# Patient Record
Sex: MALE | Race: WHITE | NOT HISPANIC OR LATINO | Employment: FULL TIME | URBAN - METROPOLITAN AREA
[De-identification: names, ages, dates, MRNs, and addresses within clinical notes are randomized per-mention and may not be internally consistent; named-entity substitution may affect disease eponyms.]

---

## 2017-12-06 ENCOUNTER — TELEPHONE (OUTPATIENT)
Dept: CARDIOLOGY | Facility: CLINIC | Age: 62
End: 2017-12-06

## 2017-12-06 NOTE — TELEPHONE ENCOUNTER
Patient called in to schedule a visit with a provider. Hasn't been seen here since 2014.  Alex in scheduling transferred the patient to me as he was requesting to talk to a nurse. He said he woke up last night to go to the bathroom and experienced 8/10 chest pain when he was up. Laid back down and it resolved because he fell back to sleep. He says for the past few months he has experienced chest pain a few times. He admits he never followed up with Dr. Gibson nor has he been taking crestor. He does take a baby aspirin every day. He says he just tried to follow a healthier lifestyle. He is scheduled to see Dr. Villatoro 1/10/18. I advised him to establish primary care somewhere before January 10 so he can get a physical and lab work. He said he will do that. I also advised him if he has severe chest pain that does not resolve or if he has chest pain with sweating or nausea to go to an ER right away. He verbalized understanding.

## 2017-12-08 ENCOUNTER — PRE VISIT (OUTPATIENT)
Dept: CARDIOLOGY | Facility: CLINIC | Age: 62
End: 2017-12-08

## 2017-12-08 ENCOUNTER — OFFICE VISIT (OUTPATIENT)
Dept: FAMILY MEDICINE | Facility: CLINIC | Age: 62
End: 2017-12-08
Payer: COMMERCIAL

## 2017-12-08 VITALS
DIASTOLIC BLOOD PRESSURE: 78 MMHG | RESPIRATION RATE: 14 BRPM | HEIGHT: 69 IN | SYSTOLIC BLOOD PRESSURE: 120 MMHG | HEART RATE: 56 BPM | WEIGHT: 174.5 LBS | TEMPERATURE: 97.8 F | OXYGEN SATURATION: 98 % | BODY MASS INDEX: 25.84 KG/M2

## 2017-12-08 DIAGNOSIS — Z00.00 ENCOUNTER FOR ROUTINE ADULT HEALTH EXAMINATION WITHOUT ABNORMAL FINDINGS: Primary | ICD-10-CM

## 2017-12-08 DIAGNOSIS — Z23 NEED FOR PROPHYLACTIC VACCINATION AND INOCULATION AGAINST INFLUENZA: ICD-10-CM

## 2017-12-08 DIAGNOSIS — Z13.1 SCREENING FOR DIABETES MELLITUS: ICD-10-CM

## 2017-12-08 DIAGNOSIS — Z13.220 LIPID SCREENING: ICD-10-CM

## 2017-12-08 DIAGNOSIS — E78.00 HYPERCHOLESTEROLEMIA: ICD-10-CM

## 2017-12-08 DIAGNOSIS — I25.10 CORONARY ARTERY DISEASE INVOLVING NATIVE CORONARY ARTERY OF NATIVE HEART WITHOUT ANGINA PECTORIS: ICD-10-CM

## 2017-12-08 DIAGNOSIS — Z12.5 SPECIAL SCREENING FOR MALIGNANT NEOPLASM OF PROSTATE: ICD-10-CM

## 2017-12-08 LAB
ALBUMIN SERPL-MCNC: 3.9 G/DL (ref 3.4–5)
ALP SERPL-CCNC: 65 U/L (ref 40–150)
ALT SERPL W P-5'-P-CCNC: 42 U/L (ref 0–70)
ANION GAP SERPL CALCULATED.3IONS-SCNC: 7 MMOL/L (ref 3–14)
AST SERPL W P-5'-P-CCNC: 26 U/L (ref 0–45)
BILIRUB SERPL-MCNC: 0.8 MG/DL (ref 0.2–1.3)
BUN SERPL-MCNC: 17 MG/DL (ref 7–30)
CALCIUM SERPL-MCNC: 9.2 MG/DL (ref 8.5–10.1)
CHLORIDE SERPL-SCNC: 104 MMOL/L (ref 94–109)
CHOLEST SERPL-MCNC: 236 MG/DL
CO2 SERPL-SCNC: 30 MMOL/L (ref 20–32)
CREAT SERPL-MCNC: 0.89 MG/DL (ref 0.66–1.25)
GFR SERPL CREATININE-BSD FRML MDRD: 86 ML/MIN/1.7M2
GLUCOSE SERPL-MCNC: 86 MG/DL (ref 70–99)
HBA1C MFR BLD: 5 % (ref 4.3–6)
HDLC SERPL-MCNC: 40 MG/DL
LDLC SERPL CALC-MCNC: 139 MG/DL
NONHDLC SERPL-MCNC: 196 MG/DL
POTASSIUM SERPL-SCNC: 4.2 MMOL/L (ref 3.4–5.3)
PROT SERPL-MCNC: 7.6 G/DL (ref 6.8–8.8)
PSA SERPL-ACNC: 3.76 UG/L (ref 0–4)
SODIUM SERPL-SCNC: 141 MMOL/L (ref 133–144)
TRIGL SERPL-MCNC: 284 MG/DL

## 2017-12-08 PROCEDURE — 99396 PREV VISIT EST AGE 40-64: CPT | Mod: 25 | Performed by: FAMILY MEDICINE

## 2017-12-08 PROCEDURE — 80061 LIPID PANEL: CPT | Performed by: FAMILY MEDICINE

## 2017-12-08 PROCEDURE — 80053 COMPREHEN METABOLIC PANEL: CPT | Performed by: FAMILY MEDICINE

## 2017-12-08 PROCEDURE — 83036 HEMOGLOBIN GLYCOSYLATED A1C: CPT | Performed by: FAMILY MEDICINE

## 2017-12-08 PROCEDURE — 36415 COLL VENOUS BLD VENIPUNCTURE: CPT | Performed by: FAMILY MEDICINE

## 2017-12-08 PROCEDURE — G0103 PSA SCREENING: HCPCS | Performed by: FAMILY MEDICINE

## 2017-12-08 PROCEDURE — 90686 IIV4 VACC NO PRSV 0.5 ML IM: CPT | Performed by: FAMILY MEDICINE

## 2017-12-08 PROCEDURE — 90471 IMMUNIZATION ADMIN: CPT | Performed by: FAMILY MEDICINE

## 2017-12-08 NOTE — PROGRESS NOTES
SUBJECTIVE:   CC: Yannick Urena is an 62 year old male who presents for preventative health visit.     Physical   Annual:     Getting at least 3 servings of Calcium per day::  Yes    Bi-annual eye exam::  NO    Dental care twice a year::  NO    Sleep apnea or symptoms of sleep apnea::  None    Diet::  Regular (no restrictions)    Taking medications regularly::  Yes    Medication side effects::  Muscle aches    Additional concerns today::  YES      Today's PHQ-2 Score:   PHQ-2 ( 1999 [employer]) 12/8/2017   Q1: Little interest or pleasure in doing things 0   Q2: Feeling down, depressed or hopeless 0   PHQ-2 Score 0   Q1: Little interest or pleasure in doing things Not at all   Q2: Feeling down, depressed or hopeless Not at all   PHQ-2 Score 0       Abuse: Current or Past(Physical, Sexual or Emotional)- No  Do you feel safe in your environment - Yes    Social History   Substance Use Topics     Smoking status: Never Smoker     Smokeless tobacco: Never Used     Alcohol use Yes      Comment: once a month     The patient does not drink >3 drinks per day nor >7 drinks per week.    Last PSA:   PSA   Date Value Ref Range Status   12/14/2015 3.63 0 - 4 ug/L Final       Reviewed orders with patient. Reviewed health maintenance and updated orders accordingly - Yes  Labs reviewed in EPIC    Reviewed and updated as needed this visit by clinical staff  Tobacco  Allergies  Meds  Med Hx  Surg Hx  Fam Hx  Soc Hx        Reviewed and updated as needed this visit by Provider        Past Medical History:   Diagnosis Date     Benign essential hypertension      Coronary artery disease     CT calcium score 2012 @ [facility] = 286     Hyperlipidemia LDL goal <130       Past Surgical History:   Procedure Laterality Date     EXTRACTION(S) DENTAL  2013      REPAIR OF NASAL SEPTUM  1978     TONSILLECTOMY  1960's     wisdom teeth removal  age 14       Review of Systems  C: NEGATIVE for fever, chills, change in weight  I: NEGATIVE for  "worrisome rashes, moles or lesions  E: NEGATIVE for vision changes or irritation  ENT: NEGATIVE for ear, mouth and throat problems  R: NEGATIVE for significant cough or SOB  CV: NEGATIVE for chest pain, palpitations or peripheral edema  GI: NEGATIVE for nausea, abdominal pain, heartburn, or change in bowel habits   male: negative for dysuria, hematuria, decreased urinary stream, erectile dysfunction, urethral discharge  M: NEGATIVE for significant arthralgias or myalgia  N: NEGATIVE for weakness, dizziness or paresthesias  P: NEGATIVE for changes in mood or affect    OBJECTIVE:   /78 (BP Location: Right arm, Patient Position: Sitting, Cuff Size: Adult Regular)  Pulse 56  Temp 97.8  F (36.6  C) (Tympanic)  Resp 14  Ht 5' 9.25\" (1.759 m)  Wt 174 lb 8 oz (79.2 kg)  SpO2 98%  BMI 25.58 kg/m2    Physical Exam  GENERAL: healthy, alert and no distress  EYES: Eyes grossly normal to inspection, PERRL and conjunctivae and sclerae normal  HENT: ear canals and TM's normal, nose and mouth without ulcers or lesions  NECK: no adenopathy, no asymmetry, masses, or scars and thyroid normal to palpation  RESP: lungs clear to auscultation - no rales, rhonchi or wheezes  CV: regular rate and rhythm, normal S1 S2, no S3 or S4, no murmur, click or rub, no peripheral edema and peripheral pulses strong  ABDOMEN: soft, nontender, no hepatosplenomegaly, no masses and bowel sounds normal  MS: no gross musculoskeletal defects noted, no edema  SKIN: no suspicious lesions or rashes  NEURO: Normal strength and tone, mentation intact and speech normal  PSYCH: mentation appears normal, affect normal/bright    ASSESSMENT/PLAN:   Yannick was seen today for physical.    Diagnoses and all orders for this visit:    Encounter for routine adult health examination without abnormal findings    Lipid screening  -     Lipid Profile (Chol, Trig, HDL, LDL calc)  -     Comprehensive metabolic panel (BMP + Alb, Alk Phos, ALT, AST, Total. Bili, " "TP)    Screening for diabetes mellitus  -     Hemoglobin A1c  -     Comprehensive metabolic panel (BMP + Alb, Alk Phos, ALT, AST, Total. Bili, TP)    Special screening for malignant neoplasm of prostate  -     PSA, screen    Need for prophylactic vaccination and inoculation against influenza  -     FLU VAC, SPLIT VIRUS IM > 3 YO (QUADRIVALENT) [95615]  -     Vaccine Administration, Initial [36919]      Pt requesting PSA screen again.  Deferred rectal/prostrate check per pt request.  No chest pain complaints today.  Seeing Cardiology in Jan 2018.  Last Colonoscopy in 2011, due in 10 years (year 2021).   COUNSELING:   Reviewed preventive health counseling, as reflected in patient instructions  Special attention given to:        Regular exercise       Healthy diet/nutrition       Vision screening       Hearing screening       Colon cancer screening       Prostate cancer screening           reports that he has never smoked. He has never used smokeless tobacco.      Estimated body mass index is 25.58 kg/(m^2) as calculated from the following:    Height as of this encounter: 5' 9.25\" (1.759 m).    Weight as of this encounter: 174 lb 8 oz (79.2 kg).   Weight management plan: Discussed healthy diet and exercise guidelines and patient will follow up in 1 month in clinic to re-evaluate.    Counseling Resources:  ATP IV Guidelines  Pooled Cohorts Equation Calculator  FRAX Risk Assessment  ICSI Preventive Guidelines  Dietary Guidelines for Americans, 2010  USDA's MyPlate  ASA Prophylaxis  Lung CA Screening    Gracie Wolff, [facility]  Answers for HPI/ROS submitted by the patient on 12/8/2017   PHQ-2 Score: 0    "

## 2017-12-08 NOTE — PROGRESS NOTES

## 2017-12-08 NOTE — NURSING NOTE
"Chief Complaint   Patient presents with     Physical     62 year old male presents for physical exam.       Initial /78 (BP Location: Right arm, Patient Position: Sitting, Cuff Size: Adult Regular)  Pulse 56  Temp 97.8  F (36.6  C) (Tympanic)  Resp 14  Ht 5' 9.25\" (1.759 m)  Wt 174 lb 8 oz (79.2 kg)  SpO2 98%  BMI 25.58 kg/m2 Estimated body mass index is 25.58 kg/(m^2) as calculated from the following:    Height as of this encounter: 5' 9.25\" (1.759 m).    Weight as of this encounter: 174 lb 8 oz (79.2 kg).  Medication Reconciliation: complete     Katelynn Dickinosn LPN  "

## 2017-12-08 NOTE — LETTER
"December 11, 2017      Yannick Urena  [address] [facility] [address]        Dear ,    We are writing to inform you of your test results.    1.  Cholesterol:  We use a calculation recommended from the American College of Cardiology (the \"ASCVD\" risk calculation, \"ArtheroSclerotic Cardiovascular Disease\") before starting anyone on a cholesterol medication.  This measures the risk of getting a heart-attack/stroke in the next 10 years, and your risk is 12.8% and a high-dose cholesterol medication is what's recommended (or the highest dose that you can tolerate).   The main risk factors used in the calculation are older age, elevated blood pressure (\"hypertension\"), diabetes, cholesterol level, and a history of smoking tobacco.  We typically start cholesterol medication when the risk is >7.5%.   I would recommend that we re-start your Crestor 20 mg daily and increase as tolerated to a goal of 40 mg daily. I've sent a refill to your pharmacy.    2.  Your other lab tests are normal.  --Your HgA1c (diabetes screen) is normal at 5%  --Your PSA level (prostrate cancer screen) is stable and within the normal range at 3.76.  --And your CMP (liver and kidney functions) are normal.    Resulted Orders   Lipid Profile (Chol, Trig, HDL, LDL calc)   Result Value Ref Range    Cholesterol 236 (H) <200 mg/dL      Comment:      Desirable:       <200 mg/dl    Triglycerides 284 (H) <150 mg/dL      Comment:      Borderline high:  150-199 mg/dl  High:             200-499 mg/dl  Very high:       >499 mg/dl  Fasting specimen      HDL Cholesterol 40 >39 mg/dL    LDL Cholesterol Calculated 139 (H) <100 mg/dL      Comment:      Above desirable:  100-129 mg/dl  Borderline High:  130-159 mg/dL  High:             160-189 mg/dL  Very high:       >189 mg/dl      Non HDL Cholesterol 196 (H) <130 mg/dL      Comment:      Above Desirable:  130-159 mg/dl  Borderline high:  160-189 mg/dl  High:             190-219 mg/dl  Very " high:       >219 mg/dl     Hemoglobin A1c   Result Value Ref Range    Hemoglobin A1C 5.0 4.3 - 6.0 %   Comprehensive metabolic panel (BMP + Alb, Alk Phos, ALT, AST, Total. Bili, TP)   Result Value Ref Range    Sodium 141 133 - 144 mmol/L    Potassium 4.2 3.4 - 5.3 mmol/L    Chloride 104 94 - 109 mmol/L    Carbon Dioxide 30 20 - 32 mmol/L    Anion Gap 7 3 - 14 mmol/L    Glucose 86 70 - 99 mg/dL      Comment:      Fasting specimen    Urea Nitrogen 17 7 - 30 mg/dL    Creatinine 0.89 0.66 - 1.25 mg/dL    GFR Estimate 86 >60 mL/min/1.7m2      Comment:      Non  GFR Calc    GFR Estimate If Black >90 >60 mL/min/1.7m2      Comment:       GFR Calc    Calcium 9.2 8.5 - 10.1 mg/dL    Bilirubin Total 0.8 0.2 - 1.3 mg/dL    Albumin 3.9 3.4 - 5.0 g/dL    Protein Total 7.6 6.8 - 8.8 g/dL    Alkaline Phosphatase 65 40 - 150 U/L    ALT 42 0 - 70 U/L    AST 26 0 - 45 U/L   PSA, screen   Result Value Ref Range    PSA 3.76 0 - 4 ug/L      Comment:      Assay Method:  Chemiluminescence using Siemens Vista analyzer       If you have any questions or concerns, please call the clinic at the number listed above.       Sincerely,        Gracie Wolff, DO

## 2017-12-08 NOTE — MR AVS SNAPSHOT
After Visit Summary   12/8/2017    Yannick Urena    MRN: [id number]           Patient Information     Date Of Birth          1955        Visit Information        Provider Department      12/8/2017 9:50 AM Gracie Wolff, DO [facility]        Today's Diagnoses     Lipid screening    -  1    Screening for diabetes mellitus        Special screening for malignant neoplasm of prostate          Care Instructions      Preventive Health Recommendations  Male Ages 50 - 64    Yearly exam:             See your health care provider every year in order to  o   Review health changes.   o   Discuss preventive care.    o   Review your medicines if your doctor has prescribed any.     Have a cholesterol test every 5 years, or more frequently if you are at risk for high cholesterol/heart disease.     Have a diabetes test (fasting glucose) every three years. If you are at risk for diabetes, you should have this test more often.     Have a colonoscopy at age 50, or have a yearly FIT test (stool test). These exams will check for colon cancer.      Talk with your health care provider about whether or not a prostate cancer screening test (PSA) is right for you.    You should be tested each year for STDs (sexually transmitted diseases), if you re at risk.     Shots: Get a flu shot each year. Get a tetanus shot every 10 years.     Nutrition:    Eat at least 5 servings of fruits and vegetables daily.     Eat whole-grain bread, whole-wheat pasta and brown rice instead of white grains and rice.     Talk to your provider about Calcium and Vitamin D.     Lifestyle    Exercise for at least 150 minutes a week (30 minutes a day, 5 days a week). This will help you control your weight and prevent disease.     Limit alcohol to one drink per day.     No smoking.     Wear sunscreen to prevent skin cancer.     See your dentist every six months for an exam and cleaning.     See your eye doctor every  "1 to 2 years.            Follow-ups after your visit        Follow-up notes from your care team     Return if symptoms worsen or fail to improve.      Your next 10 appointments already scheduled     Ayo 10, 2018  8:15 AM CST   New Visit with Oscar Villatoro MD   [facility] ([facility])    [address] [place] [address] [phone] [phone]              Who to contact     If you have questions or need follow up information about today's clinic visit or your schedule please contact [facility] directly at [phone].  Normal or non-critical lab and imaging results will be communicated to you by MyChart, letter or phone within 4 business days after the clinic has received the results. If you do not hear from us within 7 days, please contact the clinic through MyChart or phone. If you have a critical or abnormal lab result, we will notify you by phone as soon as possible.  Submit refill requests through [employer] or call your pharmacy and they will forward the refill request to us. Please allow 3 business days for your refill to be completed.          Additional Information About Your Visit        MyChart Information     [employer] lets you send messages to your doctor, view your test results, renew your prescriptions, schedule appointments and more. To sign up, go to [email or web address] . Click on \"Log in\" on the left side of the screen, which will take you to the Welcome page. Then click on \"Sign up Now\" on the right side of the page.     You will be asked to enter the access code listed below, as well as some personal information. Please follow the directions to create your username and password.     Your access code is: [email or web address]  Expires: 3/8/2018 10:24 AM     Your access code will  in 90 days. If you need help or a new code, please call your [facility] or [phone].        Care EveryWhere ID     This " "is your Care EveryWhere ID. This could be used by other organizations to access your [facility] medical records  [id number]        Your Vitals Were     Pulse Temperature Respirations Height Pulse Oximetry BMI (Body Mass Index)    56 97.8  F (36.6  C) (Tympanic) 14 5' 9.25\" (1.759 m) 98% 25.58 kg/m2       Blood Pressure from Last 3 Encounters:   12/08/17 120/78   12/14/15 130/76   06/01/15 133/79    Weight from Last 3 Encounters:   12/08/17 174 lb 8 oz (79.2 kg)   12/14/15 170 lb (77.1 kg)   06/01/15 166 lb (75.3 kg)              We Performed the Following     Comprehensive metabolic panel (BMP + Alb, Alk Phos, ALT, AST, Total. Bili, TP)     Hemoglobin A1c     Lipid Profile (Chol, Trig, HDL, LDL calc)     PSA, screen        Primary Care Provider Office Phone # Fax #    Radha Neeta Clement,  [phone]       [place] [address]        Equal Access to Services     [facility] : Hadii aad ku hadasho Soomaali, waaxda luqadaha, qaybta kaalmada adesaroj, bess conner. So [facility] [phone].    ATENCIÓN: Si habla español, tiene a castro disposición servicios gratuitos de asistencia lingüística. Nadine al [phone].    We comply with applicable federal civil rights laws and [place] laws. We do not discriminate on the basis of race, color, national origin, age, disability, sex, sexual orientation, or gender identity.            Thank you!     Thank you for choosing [facility]  for your care. Our goal is always to provide you with excellent care. Hearing back from our patients is one way we can continue to improve our services. Please take a few minutes to complete the written survey that you may receive in the mail after your visit with us. Thank you!             Your Updated Medication List - Protect others around you: Learn how to safely use, store and throw away your medicines at www.disposemymeds.org.          This list " is accurate as of: 12/8/17 10:24 AM.  Always use your most recent med list.                   Brand Name Dispense Instructions for use Diagnosis    aspirin 325 MG tablet      Take 1 tablet by mouth daily.        MULTI-VITAMIN DAILY PO      Take  by mouth.        rosuvastatin 20 MG tablet    CRESTOR    90 tablet    Take 1 tablet (20 mg) by mouth daily    Hypercholesterolemia

## 2017-12-11 RX ORDER — ROSUVASTATIN CALCIUM 20 MG/1
20 TABLET, COATED ORAL DAILY
Qty: 90 TABLET | Refills: 1 | Status: SHIPPED | OUTPATIENT
Start: 2017-12-11 | End: 2018-11-28

## 2017-12-14 NOTE — TELEPHONE ENCOUNTER
Reply from [place] clinic, patient was not seen there, his PMD changed clinics. Spoke with patient and he has re-established care with Dr. Wolff. He states he has had no care for about 3 years, but his brother needed 4 stents recently and this prompted him to call. Patient also notes that he occasionally wakes at night with chest discomfort although not pain. He had another episode the week of December 4th so decided to have a physical and see cardiology again.

## 2018-01-10 ENCOUNTER — OFFICE VISIT (OUTPATIENT)
Dept: CARDIOLOGY | Facility: CLINIC | Age: 63
End: 2018-01-10
Payer: COMMERCIAL

## 2018-01-10 VITALS
BODY MASS INDEX: 26.07 KG/M2 | WEIGHT: 176 LBS | HEART RATE: 60 BPM | DIASTOLIC BLOOD PRESSURE: 70 MMHG | HEIGHT: 69 IN | SYSTOLIC BLOOD PRESSURE: 136 MMHG

## 2018-01-10 DIAGNOSIS — I25.10 CORONARY ARTERY DISEASE INVOLVING NATIVE CORONARY ARTERY OF NATIVE HEART WITHOUT ANGINA PECTORIS: Primary | ICD-10-CM

## 2018-01-10 PROCEDURE — 93000 ELECTROCARDIOGRAM COMPLETE: CPT | Performed by: INTERNAL MEDICINE

## 2018-01-10 PROCEDURE — 99204 OFFICE O/P NEW MOD 45 MIN: CPT | Performed by: INTERNAL MEDICINE

## 2018-01-10 NOTE — PROGRESS NOTES
HISTORY OF PRESENT ILLNESS:  I had the pleasure of seeing Mr. Yannick Urena in consultation at [facility] today.  He is a very pleasant 62-year-old gentleman who has a history of coronary artery disease based on a coronary artery calcium score performed in 2012.  At that time he was found to have a calcium score to 286.3 with a left main calcium score of 37.9, LAD of 34.9, circumflex 64.5 and RCA of 159.  He also has a significant family history of premature coronary disease and significant dyslipidemia and saw Dr. Gibson in 03/2013 for evaluation for this.        He ultimately underwent a nuclear stress test in 03/2013 which was negative for ischemia.  Subsequently, he was started on Lipitor and aspirin.  He did not tolerate Lipitor due to muscle aches and pains and was prescribed Crestor.        He has not really had any cardiology followup since 2014.  He actually discontinued his statin as well over the past few years.  Recently he reestablished primary care and did mention some occasional chest discomfort, and therefore Cardiology consultation was requested.  He also had a lipid panel performed earlier in December and his Crestor was restarted at that time.      He presents today feeling well overall from a cardiovascular standpoint.  In early December, he experienced an episode where he woke up at night with substernal chest discomfort.  This resolved in about 30 minutes.  He does describe 3-4 similar episodes that have always occurred at rest.  He is a very active individual and mows lawns during the summer.  He recently was hunting and walked up to 14 miles without any chest discomfort or shortness of breath.  He denies any palpitations, syncope or presyncope.  Denies any PND or lower extremity edema.      His past medical history, social history, allergies and meds are reviewed in my Epic note.  Physical exam is dictated below.      Lipids on 12/08/2017 demonstrated total  cholesterol of 236, HDL 40, , triglycerides of 284.  When I asked him about the duration of his Crestor therapy, he stated that he did not  his prescription for at least a week and therefore has only been on lipid-lowering therapy for the past 2-3 weeks.      An EKG today demonstrated normal sinus rhythm with no acute ST-T wave abnormalities.      IMPRESSION:   1.  History of coronary disease based on an abnormal calcium score in .   2.  Significant dyslipidemia which has been essentially untreated over the past few years.   3.  Significant family history of coronary artery disease.   4.  Atypical chest discomfort in 2017.  His symptoms have not recurred and have never been noted with exertion.      Mr. Urena presents for an evaluation regarding an episode of chest discomfort that is quite atypical in that it started at rest and has not recurred with heavy exertion over the past month or so.  Nevertheless, given his significant risk factor profile, I think that further evaluation is warranted and I have ordered an exercise stress nuclear perfusion study for further evaluation.  I explained to the patient that if this identifies areas of ischemia and then a coronary angiography may be indicated.      From a risk factor reduction standpoint, I have stressed the importance of compliance with lipid-lowering therapy.  A followup lipid panel should be performed in approximately 3-4 weeks.  He will continue aspirin as previously prescribed.      It was a pleasure seeing him in consultation.         CHRISTINA FRANCIS MD             D: 01/10/2018 08:44   T: 01/10/2018 10:02   MT: ALTHEA      Name:     ANA URENA   MRN:      [id number]        Account:      [id number]   :      1955           Service Date: 01/10/2018      Document: [id number]

## 2018-01-10 NOTE — LETTER
1/10/2018      Gracie Wolff, DO  [address]  [facility] [address]      RE: Yannick Antonioon       Dear Colleague,    I had the pleasure of seeing Yannick Urena in the [facility].    HISTORY OF PRESENT ILLNESS:  I had the pleasure of seeing Mr. Yannick Urena in consultation at [facility] today.  He is a very pleasant 62-year-old gentleman who has a history of coronary artery disease based on a coronary artery calcium score performed in 2012.  At that time he was found to have a calcium score to 286.3 with a left main calcium score of 37.9, LAD of 34.9, circumflex 64.5 and RCA of 159.  He also has a significant family history of premature coronary disease and significant dyslipidemia and saw Dr. Gibson in 03/2013 for evaluation for this.        He ultimately underwent a nuclear stress test in 03/2013 which was negative for ischemia.  Subsequently, he was started on Lipitor and aspirin.  He did not tolerate Lipitor due to muscle aches and pains and was prescribed Crestor.        He has not really had any cardiology followup since 2014.  He actually discontinued his statin as well over the past few years.  Recently he reestablished primary care and did mention some occasional chest discomfort, and therefore Cardiology consultation was requested.  He also had a lipid panel performed earlier in December and his Crestor was restarted at that time.      He presents today feeling well overall from a cardiovascular standpoint.  In early December, he experienced an episode where he woke up at night with substernal chest discomfort.  This resolved in about 30 minutes.  He does describe 3-4 similar episodes that have always occurred at rest.  He is a very active individual and mows lawns during the summer.  He recently was hunting and walked up to 14 miles without any chest discomfort or shortness of breath.  He denies any palpitations, syncope or  presyncope.  Denies any PND or lower extremity edema.      His past medical history, social history, allergies and meds are reviewed in my Epic note.  Physical exam is dictated below.      Lipids on 12/08/2017 demonstrated total cholesterol of 236, HDL 40, , triglycerides of 284.  When I asked him about the duration of his Crestor therapy, he stated that he did not  his prescription for at least a week and therefore has only been on lipid-lowering therapy for the past 2-3 weeks.      An EKG today demonstrated normal sinus rhythm with no acute ST-T wave abnormalities.      IMPRESSION:   1.  History of coronary disease based on an abnormal calcium score in 2012.   2.  Significant dyslipidemia which has been essentially untreated over the past few years.   3.  Significant family history of coronary artery disease.   4.  Atypical chest discomfort in 12/2017.  His symptoms have not recurred and have never been noted with exertion.      Mr. Urena presents for an evaluation regarding an episode of chest discomfort that is quite atypical in that it started at rest and has not recurred with heavy exertion over the past month or so.  Nevertheless, given his significant risk factor profile, I think that further evaluation is warranted and I have ordered an exercise stress nuclear perfusion study for further evaluation.  I explained to the patient that if this identifies areas of ischemia and then a coronary angiography may be indicated.      From a risk factor reduction standpoint, I have stressed the importance of compliance with lipid-lowering therapy.  A followup lipid panel should be performed in approximately 3-4 weeks.  He will continue aspirin as previously prescribed.      It was a pleasure seeing him in consultation.     Outpatient Encounter Prescriptions as of 1/10/2018   Medication Sig Dispense Refill     rosuvastatin (CRESTOR) 20 MG tablet Take 1 tablet (20 mg) by mouth daily 90 tablet 1     aspirin  325 MG tablet Take 1 tablet by mouth daily.       Multiple Vitamin (MULTI-VITAMIN DAILY PO) Take  by mouth.       No facility-administered encounter medications on file as of 1/10/2018.      Again, thank you for allowing me to participate in the care of your patient.      Sincerely,    Oscar Villatoro MD     [facility]

## 2018-01-10 NOTE — PROGRESS NOTES
HPI and Plan:   See dictation    Orders Placed This Encounter   Procedures     NM Exercise stress test (nuc card)     Lipid Profile     EKG 12-lead complete w/read - Clinics (performed today)       No orders of the defined types were placed in this encounter.      There are no discontinued medications.      Encounter Diagnosis   Name Primary?     Coronary artery disease involving native coronary artery of native heart without angina pectoris Yes       CURRENT MEDICATIONS:  Current Outpatient Prescriptions   Medication Sig Dispense Refill     rosuvastatin (CRESTOR) 20 MG tablet Take 1 tablet (20 mg) by mouth daily 90 tablet 1     aspirin 325 MG tablet Take 1 tablet by mouth daily.       Multiple Vitamin (MULTI-VITAMIN DAILY PO) Take  by mouth.         ALLERGIES     Allergies   Allergen Reactions     Atorvastatin Muscle Pain (Myalgia)     Penicillins      Unknown reaction     Seasonal Allergies        PAST MEDICAL HISTORY:  Past Medical History:   Diagnosis Date     Benign essential hypertension      Coronary artery disease     CT calcium score 2012 @ [facility] = 286     Hyperlipidemia LDL goal <130        PAST SURGICAL HISTORY:  Past Surgical History:   Procedure Laterality Date     EXTRACTION(S) DENTAL  2013      REPAIR OF NASAL SEPTUM  1978     TONSILLECTOMY  1960's     wisdom teeth removal  age 14       FAMILY HISTORY:  Family History   Problem Relation Age of Onset     HEART DISEASE Father      DIABETES No family hx of        SOCIAL HISTORY:  Social History     Social History     Marital status:      Spouse name: N/A     Number of children: N/A     Years of education: N/A     Occupational History      [employer]     service, [employer]     Social History Main Topics     Smoking status: Never Smoker     Smokeless tobacco: Never Used     Alcohol use Yes      Comment: once a month     Drug use: No     Sexual activity: Yes     Partners: Female      Comment: wife menopause     Other Topics Concern      "Parent/Sibling W/ Cabg, Mi Or Angioplasty Before 65f 55m? Yes     Bike Helmet Yes     Seat Belt Yes     Social History Narrative       Review of Systems:  Skin:  Negative       Eyes:  Negative      ENT:  Negative      Respiratory:  Negative       Cardiovascular:    Positive for;chest pain occ - family hx strong for cad  Gastroenterology: Negative      Genitourinary:  Negative      Musculoskeletal:  Negative      Neurologic:  Negative      Psychiatric:  Negative      Heme/Lymph/Imm:  Negative      Endocrine:  Negative        Physical Exam:  Vitals: /70  Pulse 60  Ht 1.759 m (5' 9.25\")  Wt 79.8 kg (176 lb)  BMI 25.8 kg/m2    Constitutional:  cooperative        Skin:  warm and dry to the touch          Head:  normocephalic        Eyes:  pupils equal and round        Lymph:No Cervical lymphadenopathy present     ENT:           Neck:  carotid pulses are full and equal bilaterally        Respiratory:  clear to auscultation         Cardiac:                  pulses full and equal                                        GI:  abdomen soft        Extremities and Muscular Skeletal:  no edema              Neurological:  no gross motor deficits;affect appropriate        Psych:           CC  No referring provider defined for this encounter.              "

## 2018-01-10 NOTE — MR AVS SNAPSHOT
"              After Visit Summary   1/10/2018    Yannick Urena    MRN: [id number]           Patient Information     Date Of Birth          1955        Visit Information        Provider Department      1/10/2018 8:15 AM Oscar Villatoro MD [facility]        Today's Diagnoses     Coronary artery disease involving native coronary artery of native heart without angina pectoris    -  1       Follow-ups after your visit        Future tests that were ordered for you today     Open Future Orders        Priority Expected Expires Ordered    NM Exercise stress test (nuc card) Routine 1/17/2018 1/10/2019 1/10/2018    Lipid Profile Routine 2/9/2018 1/10/2019 1/10/2018            Who to contact     If you have questions or need follow up information about today's clinic visit or your schedule please contact [facility] directly at [phone].  Normal or non-critical lab and imaging results will be communicated to you by MyChart, letter or phone within 4 business days after the clinic has received the results. If you do not hear from us within 7 days, please contact the clinic through [email or web address] or phone. If you have a critical or abnormal lab result, we will notify you by phone as soon as possible.  Submit refill requests through [employer] or call your pharmacy and they will forward the refill request to us. Please allow 3 business days for your refill to be completed.          Additional Information About Your Visit        MyChart Information     [employer] lets you send messages to your doctor, view your test results, renew your prescriptions, schedule appointments and more. To sign up, go to [email or web address] . Click on \"Log in\" on the left side of the screen, which will take you to the Welcome page. Then click on \"Sign up Now\" on the right side of the page.     You will be asked to enter the access code listed below, as well as some " "personal information. Please follow the directions to create your username and password.     Your access code is: [email or web address]  Expires: 3/8/2018 10:24 AM     Your access code will  in 90 days. If you need help or a new code, please call your [facility] clinic or [phone].        Care EveryWhere ID     This is your Care EveryWhere ID. This could be used by other organizations to access your [facility] medical records  [id number]        Your Vitals Were     Pulse Height BMI (Body Mass Index)             60 1.759 m (5' 9.25\") 25.8 kg/m2          Blood Pressure from Last 3 Encounters:   01/10/18 136/70   17 120/78   12/14/15 130/76    Weight from Last 3 Encounters:   01/10/18 79.8 kg (176 lb)   17 79.2 kg (174 lb 8 oz)   12/14/15 77.1 kg (170 lb)              We Performed the Following     EKG 12-lead complete w/read - Clinics (performed today)        Primary Care Provider Office Phone # Fax #    Gracie Wendy Wolff [phone] [address] [address]        Equal Access to Services     PAMELLA CASTELLANOS AH: Hadii aad ku hadasho Soomaali, waaxda luqadaha, qaybta kaalmada adeegyada, waxay idiin hayjaymie conner. So [facility] [phone].    ATENCIÓN: Si habla español, tiene a castro disposición servicios gratuitos de asistencia lingüística. Llame al [phone].    We comply with applicable federal civil rights laws and [place] laws. We do not discriminate on the basis of race, color, national origin, age, disability, sex, sexual orientation, or gender identity.            Thank you!     Thank you for choosing [facility]  for your care. Our goal is always to provide you with excellent care. Hearing back from our patients is one way we can continue to improve our services. Please take a few minutes to complete the written survey that you may receive in the mail after your visit with us. Thank you!             Your " Updated Medication List - Protect others around you: Learn how to safely use, store and throw away your medicines at www.disposemymeds.org.          This list is accurate as of: 1/10/18  8:38 AM.  Always use your most recent med list.                   Brand Name Dispense Instructions for use Diagnosis    aspirin 325 MG tablet      Take 1 tablet by mouth daily.        MULTI-VITAMIN DAILY PO      Take  by mouth.        rosuvastatin 20 MG tablet    CRESTOR    90 tablet    Take 1 tablet (20 mg) by mouth daily    Hypercholesterolemia

## 2018-01-11 ENCOUNTER — HOSPITAL ENCOUNTER (OUTPATIENT)
Dept: CARDIOLOGY | Facility: CLINIC | Age: 63
Discharge: HOME | End: 2018-01-11
Attending: INTERNAL MEDICINE | Admitting: INTERNAL MEDICINE
Payer: COMMERCIAL

## 2018-01-11 DIAGNOSIS — I25.10 CORONARY ARTERY DISEASE INVOLVING NATIVE CORONARY ARTERY OF NATIVE HEART WITHOUT ANGINA PECTORIS: ICD-10-CM

## 2018-01-11 PROCEDURE — 34300033 ZZH RX 343: Performed by: INTERNAL MEDICINE

## 2018-01-11 PROCEDURE — A9502 TC99M TETROFOSMIN: HCPCS | Performed by: INTERNAL MEDICINE

## 2018-01-11 PROCEDURE — 93018 CV STRESS TEST I&R ONLY: CPT | Performed by: INTERNAL MEDICINE

## 2018-01-11 PROCEDURE — 93016 CV STRESS TEST SUPVJ ONLY: CPT | Performed by: INTERNAL MEDICINE

## 2018-01-11 PROCEDURE — 93017 CV STRESS TEST TRACING ONLY: CPT

## 2018-01-11 PROCEDURE — 78452 HT MUSCLE IMAGE SPECT MULT: CPT | Mod: 26 | Performed by: INTERNAL MEDICINE

## 2018-01-11 RX ADMIN — TETROFOSMIN 3.5 MCI.: 1.38 INJECTION, POWDER, LYOPHILIZED, FOR SOLUTION INTRAVENOUS at 09:14

## 2018-01-11 RX ADMIN — TETROFOSMIN 9.8 MCI.: 1.38 INJECTION, POWDER, LYOPHILIZED, FOR SOLUTION INTRAVENOUS at 10:27

## 2018-01-12 ENCOUNTER — TELEPHONE (OUTPATIENT)
Dept: CARDIOLOGY | Facility: CLINIC | Age: 63
End: 2018-01-12

## 2018-01-12 DIAGNOSIS — I25.10 CORONARY ARTERY DISEASE INVOLVING NATIVE CORONARY ARTERY OF NATIVE HEART WITHOUT ANGINA PECTORIS: Primary | ICD-10-CM

## 2018-01-12 NOTE — TELEPHONE ENCOUNTER
Called patient with results of nuclear study as showing no ischemia and good pumping function. Patient is due to repeat lipids in February, he will call back to schedule as he is traveling for work. Reviewed Dr. Villatoro's recommendation to see Dr. Gibson in 6 months, patient states he liked Dr. Villatoro and would like to stay on his schedule. Order placed for f/u visit.

## 2018-01-12 NOTE — TELEPHONE ENCOUNTER
Nuclear stress test 1-11-18 ordered at  1-10-18 by Dr. Villatoro  1.  Myocardial perfusion imaging using single isotope technique  demonstrated normal myocardial perfusion with mild diaphragm  attenuation artifact. There is no ischemia or infarction identified on  this study  2. Gated images demonstrated normal wall motion and normal left  ventricular chamber size.  The left ventricular systolic function is  normal, with an ejection fraction of 57%.  3. Compared to the previous study of 3/28/2013, there has been no  Change  [place]: Dr. Sanjiv Granados message Dr. Villatoro.

## 2018-04-06 ENCOUNTER — PRE VISIT (OUTPATIENT)
Dept: CARDIOLOGY | Facility: CLINIC | Age: 63
End: 2018-04-06

## 2018-04-06 DIAGNOSIS — I25.10 CORONARY ARTERY DISEASE INVOLVING NATIVE CORONARY ARTERY OF NATIVE HEART WITHOUT ANGINA PECTORIS: ICD-10-CM

## 2018-04-06 LAB
CHOLEST SERPL-MCNC: 122 MG/DL
HDLC SERPL-MCNC: 37 MG/DL
LDLC SERPL CALC-MCNC: 59 MG/DL
NONHDLC SERPL-MCNC: 85 MG/DL
TRIGL SERPL-MCNC: 132 MG/DL

## 2018-04-06 PROCEDURE — 36415 COLL VENOUS BLD VENIPUNCTURE: CPT | Performed by: INTERNAL MEDICINE

## 2018-04-06 PROCEDURE — 80061 LIPID PANEL: CPT | Performed by: INTERNAL MEDICINE

## 2018-04-06 NOTE — TELEPHONE ENCOUNTER
Lipid panel 4/6/18 noted: ordered for f/u after resuming crestor 20mg in December.  Will message Dr. Villatoro to review

## 2018-04-09 NOTE — TELEPHONE ENCOUNTER
Called patient to review labs results using crestor 20mg and good LDL result of 59. Encourage patient to continue crestor 20mg and keep OV in August as planned. Patient verbalized understanding and agreed with plan.

## 2018-06-06 ENCOUNTER — TELEPHONE (OUTPATIENT)
Dept: FAMILY MEDICINE | Facility: CLINIC | Age: 63
End: 2018-06-06

## 2018-11-28 DIAGNOSIS — E78.00 HYPERCHOLESTEROLEMIA: ICD-10-CM

## 2018-11-28 RX ORDER — ROSUVASTATIN CALCIUM 20 MG/1
20 TABLET, COATED ORAL DAILY
Qty: 90 TABLET | Refills: 0 | Status: SHIPPED | OUTPATIENT
Start: 2018-11-28 | End: 2019-05-01

## 2019-02-15 DIAGNOSIS — I25.10 CORONARY ARTERY DISEASE INVOLVING NATIVE CORONARY ARTERY OF NATIVE HEART WITHOUT ANGINA PECTORIS: Primary | ICD-10-CM

## 2019-02-15 DIAGNOSIS — E78.00 HYPERCHOLESTEROLEMIA: ICD-10-CM

## 2019-03-22 ENCOUNTER — OFFICE VISIT (OUTPATIENT)
Dept: CARDIOLOGY | Facility: CLINIC | Age: 64
End: 2019-03-22
Payer: COMMERCIAL

## 2019-03-22 VITALS
SYSTOLIC BLOOD PRESSURE: 151 MMHG | HEIGHT: 69 IN | HEART RATE: 51 BPM | BODY MASS INDEX: 25.92 KG/M2 | DIASTOLIC BLOOD PRESSURE: 79 MMHG | WEIGHT: 175 LBS

## 2019-03-22 DIAGNOSIS — I10 BENIGN ESSENTIAL HYPERTENSION: ICD-10-CM

## 2019-03-22 DIAGNOSIS — I25.10 CORONARY ARTERY DISEASE INVOLVING NATIVE CORONARY ARTERY OF NATIVE HEART WITHOUT ANGINA PECTORIS: Primary | ICD-10-CM

## 2019-03-22 DIAGNOSIS — I25.10 CORONARY ARTERY DISEASE INVOLVING NATIVE CORONARY ARTERY OF NATIVE HEART WITHOUT ANGINA PECTORIS: ICD-10-CM

## 2019-03-22 DIAGNOSIS — E78.00 HYPERCHOLESTEROLEMIA: ICD-10-CM

## 2019-03-22 LAB
ALT SERPL W P-5'-P-CCNC: 11 U/L (ref 5–30)
CHOLEST SERPL-MCNC: 136 MG/DL
HDLC SERPL-MCNC: 43 MG/DL
LDLC SERPL CALC-MCNC: 67 MG/DL
NONHDLC SERPL-MCNC: 93 MG/DL
TRIGL SERPL-MCNC: 128 MG/DL

## 2019-03-22 PROCEDURE — 80061 LIPID PANEL: CPT | Performed by: INTERNAL MEDICINE

## 2019-03-22 PROCEDURE — 84460 ALANINE AMINO (ALT) (SGPT): CPT | Performed by: INTERNAL MEDICINE

## 2019-03-22 PROCEDURE — 36415 COLL VENOUS BLD VENIPUNCTURE: CPT | Performed by: INTERNAL MEDICINE

## 2019-03-22 PROCEDURE — 99214 OFFICE O/P EST MOD 30 MIN: CPT | Performed by: INTERNAL MEDICINE

## 2019-03-22 RX ORDER — AMLODIPINE BESYLATE 5 MG/1
5 TABLET ORAL DAILY
Qty: 90 TABLET | Refills: 3 | Status: SHIPPED | OUTPATIENT
Start: 2019-03-22 | End: 2020-07-08

## 2019-03-22 ASSESSMENT — MIFFLIN-ST. JEOR: SCORE: 1574.17

## 2019-03-22 NOTE — LETTER
3/22/2019    Gracie Wolff, DO  [address]  [facility] [address]    RE: Yannick Urena       Dear Colleague,    I had the pleasure of seeing Yannick Urena in the [facility].    HPI and Plan:   See dictation    Orders Placed This Encounter   Procedures     Follow-Up with Cardiologist       Orders Placed This Encounter   Medications     amLODIPine (NORVASC) 5 MG tablet     Sig: Take 1 tablet (5 mg) by mouth daily     Dispense:  90 tablet     Refill:  3       There are no discontinued medications.      Encounter Diagnoses   Name Primary?     Coronary artery disease involving native coronary artery of native heart without angina pectoris Yes     Benign essential hypertension        CURRENT MEDICATIONS:  Current Outpatient Medications   Medication Sig Dispense Refill     amLODIPine (NORVASC) 5 MG tablet Take 1 tablet (5 mg) by mouth daily 90 tablet 3     aspirin 325 MG tablet Take 1 tablet by mouth daily.       rosuvastatin (CRESTOR) 20 MG tablet Take 1 tablet (20 mg) by mouth daily 90 tablet 0     Multiple Vitamin (MULTI-VITAMIN DAILY PO) Take  by mouth.         ALLERGIES     Allergies   Allergen Reactions     Atorvastatin Muscle Pain (Myalgia)     Penicillins      Unknown reaction     Seasonal Allergies        PAST MEDICAL HISTORY:  Past Medical History:   Diagnosis Date     Benign essential hypertension      Coronary artery disease     CT calcium score 2012 @ [facility] = 286     Hyperlipidemia LDL goal <130        PAST SURGICAL HISTORY:  Past Surgical History:   Procedure Laterality Date     EXTRACTION(S) DENTAL  2013      REPAIR OF NASAL SEPTUM  1978     TONSILLECTOMY  1960's     wisdom teeth removal  age 14       FAMILY HISTORY:  Family History   Problem Relation Age of Onset     Heart Disease Father      Diabetes No family hx of        SOCIAL HISTORY:  Social History     Socioeconomic History     Marital status:      Spouse name: None     Number of  children: None     Years of education: None     Highest education level: None   Occupational History     Employer: [employer]     Comment: service, [employer]   Social Needs     Financial resource strain: None     Food insecurity:     Worry: None     Inability: None     Transportation needs:     Medical: None     Non-medical: None   Tobacco Use     Smoking status: Never Smoker     Smokeless tobacco: Never Used   Substance and Sexual Activity     Alcohol use: Yes     Comment: once a month     Drug use: No     Sexual activity: Yes     Partners: Female     Comment: wife menopause   Lifestyle     Physical activity:     Days per week: None     Minutes per session: None     Stress: None   Relationships     Social connections:     Talks on phone: None     Gets together: None     Attends Taoism service: None     Active member of club or organization: None     Attends meetings of clubs or organizations: None     Relationship status: None     Intimate partner violence:     Fear of current or ex partner: None     Emotionally abused: None     Physically abused: None     Forced sexual activity: None   Other Topics Concern     Parent/sibling w/ CABG, MI or angioplasty before 65F 55M? Yes      Service Not Asked     Blood Transfusions Not Asked     Caffeine Concern Not Asked     Occupational Exposure Not Asked     Hobby Hazards Not Asked     Sleep Concern Not Asked     Stress Concern Not Asked     Weight Concern Not Asked     Special Diet Not Asked     Back Care Not Asked     Exercise Not Asked     Bike Helmet Yes     Seat Belt Yes     Self-Exams Not Asked   Social History Narrative     None       Review of Systems:  Skin:  Negative       Eyes:  Negative      ENT:  Negative      Respiratory:  Negative       Cardiovascular:    Positive for;chest pain occ - family hx strong for cad  Gastroenterology: Negative      Genitourinary:  Negative      Musculoskeletal:  Negative      Neurologic:  Negative      Psychiatric:   "Negative      Heme/Lymph/Imm:  Negative      Endocrine:  Negative        Physical Exam:  Vitals: /79   Pulse 51   Ht 1.753 m (5' 9\")   Wt 79.4 kg (175 lb)   BMI 25.84 kg/m       Constitutional:  cooperative        Skin:  warm and dry to the touch          Head:  normocephalic        Eyes:  pupils equal and round        Lymph:No Cervical lymphadenopathy present     ENT:           Neck:  carotid pulses are full and equal bilaterally        Respiratory:  clear to auscultation         Cardiac:                  pulses full and equal                                        GI:  abdomen soft        Extremities and Muscular Skeletal:  no edema              Neurological:  no gross motor deficits;affect appropriate        Psych:           CC  Gracie Wolff, DO  [address]  [place] [address]                Thank you for allowing me to participate in the care of your patient.      Sincerely,     Oscar Villatoro MD     [facility]    cc:   Gracie Wolff, DO  [address]  [place] [address]        "

## 2019-03-22 NOTE — PROGRESS NOTES
Service Date: 03/22/2019      HISTORY OF PRESENT ILLNESS:  I had the pleasure of seeing Mr. Urena in followup at the [facility] today.  He is a very pleasant 63-year-old gentleman who I last saw in 01/2018.  He has a history of coronary artery disease based on an abnormal coronary artery calcium score in 2012.  At that time he was found to have a calcium score of 286.3 with a left main score of 37.9, LAD of 34.9, circumflex 64.5 and RCA of 159.  He also has a significant family history of premature coronary artery disease and dyslipidemia.      At his last office visit, he had discontinued statin therapy and had also been experiencing some occasional chest discomfort that was not related to exertion.  He is a very active individual and had not noticed any limitations with activity.  Nevertheless, given the history of coronary artery disease based on calcium scoring I ordered a stress nuclear perfusion study for further evaluation.  I also started him on Crestor 20 mg daily.      He underwent an exercise stress perfusion study on 01/11/2018.  He exercised for 15 minutes according to the [facility] protocol.  The stress EKG and nuclear perfusion study were negative for ischemia.      He presents today feeling well overall from a cardiovascular standpoint.  He specifically denies any chest discomfort, dyspnea on exertion, PND, orthopnea or lower extremity edema.  He denies any syncope or presyncope.  He has been taking all his medications as prescribed, including aspirin and rosuvastatin.      He has been noted to be hypertensive during a recent visit to the dentist.  He states that at that time, his blood pressure was actually 170 systolic.  He has not checked it since then.      PHYSICAL EXAMINATION:  Dictated below.      He underwent a fasting lipid panel today which demonstrated a cholesterol of 136, HDL of 43, LDL of 67, and triglycerides of 128.      IMPRESSION:   1.  Coronary artery  disease based on an abnormal calcium score in 2012.  A stress nuclear perfusion study last year was negative for ischemia.   2.  Dyslipidemia which has responded appropriately to rosuvastatin.   3.  Hypertension.      Mr. Durbin is doing well overall from a cardiovascular standpoint.  There is no evidence of angina or congestive heart failure.  I do think that his blood pressure needs to be addressed given that hypertension was confirmed on our blood pressure measurements today.      I have started him on amlodipine 5 mg daily and have asked him to obtain a blood pressure cuff and measure his blood pressure at home.  Depending on the results, further adjustments to his antihypertensive therapy may be indicated.      It was a pleasure seeing him in followup today.  Assuming his clinical status remains stable, I will see him in approximately 1 year.         CHRISTINA FRANCIS MD             D: 2019   T: 2019   MT: ISABELLA      Name:     ANA GONZÁLES   MRN:      [id number]        Account:      [id number]   :      1955           Service Date: 2019      Document: [id number]

## 2019-03-22 NOTE — LETTER
3/22/2019      Gracie Wolff, DO  [address]  [facility] [address]      RE: Yannick SWEETIE Urena       Dear Colleague,    I had the pleasure of seeing Yannick Urena in the [facility].    Service Date: 03/22/2019      HISTORY OF PRESENT ILLNESS:  I had the pleasure of seeing Mr. Urena in followup at the [facility] today.  He is a very pleasant 63-year-old gentleman who I last saw in 01/2018.  He has a history of coronary artery disease based on an abnormal coronary artery calcium score in 2012.  At that time he was found to have a calcium score of 286.3 with a left main score of 37.9, LAD of 34.9, circumflex 64.5 and RCA of 159.  He also has a significant family history of premature coronary artery disease and dyslipidemia.      At his last office visit, he had discontinued statin therapy and had also been experiencing some occasional chest discomfort that was not related to exertion.  He is a very active individual and had not noticed any limitations with activity.  Nevertheless, given the history of coronary artery disease based on calcium scoring I ordered a stress nuclear perfusion study for further evaluation.  I also started him on Crestor 20 mg daily.      He underwent an exercise stress perfusion study on 01/11/2018.  He exercised for 15 minutes according to the Darren protocol.  The stress EKG and nuclear perfusion study were negative for ischemia.      He presents today feeling well overall from a cardiovascular standpoint.  He specifically denies any chest discomfort, dyspnea on exertion, PND, orthopnea or lower extremity edema.  He denies any syncope or presyncope.  He has been taking all his medications as prescribed, including aspirin and rosuvastatin.      He has been noted to be hypertensive during a recent visit to the dentist.  He states that at that time, his blood pressure was actually 170 systolic.  He has not checked  it since then.      PHYSICAL EXAMINATION:  Dictated below.      He underwent a fasting lipid panel today which demonstrated a cholesterol of 136, HDL of 43, LDL of 67, and triglycerides of 128.      IMPRESSION:   1.  Coronary artery disease based on an abnormal calcium score in .  A stress nuclear perfusion study last year was negative for ischemia.   2.  Dyslipidemia which has responded appropriately to rosuvastatin.   3.  Hypertension.      Mr. Durbin is doing well overall from a cardiovascular standpoint.  There is no evidence of angina or congestive heart failure.  I do think that his blood pressure needs to be addressed given that hypertension was confirmed on our blood pressure measurements today.      I have started him on amlodipine 5 mg daily and have asked him to obtain a blood pressure cuff and measure his blood pressure at home.  Depending on the results, further adjustments to his antihypertensive therapy may be indicated.      It was a pleasure seeing him in followup today.  Assuming his clinical status remains stable, I will see him in approximately 1 year.         OSCAR FRANCIS MD             D: 2019   T: 2019   MT: ISABELLA      Name:     ANA GONZÁLES   MRN:      [id number]        Account:      [id number]   :      1955           Service Date: 2019      Document: [id number]         Outpatient Encounter Medications as of 3/22/2019   Medication Sig Dispense Refill     amLODIPine (NORVASC) 5 MG tablet Take 1 tablet (5 mg) by mouth daily 90 tablet 3     aspirin 325 MG tablet Take 1 tablet by mouth daily.       rosuvastatin (CRESTOR) 20 MG tablet Take 1 tablet (20 mg) by mouth daily 90 tablet 0     Multiple Vitamin (MULTI-VITAMIN DAILY PO) Take  by mouth.       No facility-administered encounter medications on file as of 3/22/2019.        Again, thank you for allowing me to participate in the care of your patient.      Sincerely,    Oscar Francis MD     [place]  [facility]

## 2019-03-22 NOTE — PROGRESS NOTES
HPI and Plan:   See dictation    Orders Placed This Encounter   Procedures     Follow-Up with Cardiologist       Orders Placed This Encounter   Medications     amLODIPine (NORVASC) 5 MG tablet     Sig: Take 1 tablet (5 mg) by mouth daily     Dispense:  90 tablet     Refill:  3       There are no discontinued medications.      Encounter Diagnoses   Name Primary?     Coronary artery disease involving native coronary artery of native heart without angina pectoris Yes     Benign essential hypertension        CURRENT MEDICATIONS:  Current Outpatient Medications   Medication Sig Dispense Refill     amLODIPine (NORVASC) 5 MG tablet Take 1 tablet (5 mg) by mouth daily 90 tablet 3     aspirin 325 MG tablet Take 1 tablet by mouth daily.       rosuvastatin (CRESTOR) 20 MG tablet Take 1 tablet (20 mg) by mouth daily 90 tablet 0     Multiple Vitamin (MULTI-VITAMIN DAILY PO) Take  by mouth.         ALLERGIES     Allergies   Allergen Reactions     Atorvastatin Muscle Pain (Myalgia)     Penicillins      Unknown reaction     Seasonal Allergies        PAST MEDICAL HISTORY:  Past Medical History:   Diagnosis Date     Benign essential hypertension      Coronary artery disease     CT calcium score 2012 @ [facility] = 286     Hyperlipidemia LDL goal <130        PAST SURGICAL HISTORY:  Past Surgical History:   Procedure Laterality Date     EXTRACTION(S) DENTAL  2013      REPAIR OF NASAL SEPTUM  1978     TONSILLECTOMY  1960's     wisdom teeth removal  age 14       FAMILY HISTORY:  Family History   Problem Relation Age of Onset     Heart Disease Father      Diabetes No family hx of        SOCIAL HISTORY:  Social History     Socioeconomic History     Marital status:      Spouse name: None     Number of children: None     Years of education: None     Highest education level: None   Occupational History     Employer: [employer]     Comment: service, howie   Social Needs     Financial resource strain: None     Food insecurity:      "Worry: None     Inability: None     Transportation needs:     Medical: None     Non-medical: None   Tobacco Use     Smoking status: Never Smoker     Smokeless tobacco: Never Used   Substance and Sexual Activity     Alcohol use: Yes     Comment: once a month     Drug use: No     Sexual activity: Yes     Partners: Female     Comment: wife menopause   Lifestyle     Physical activity:     Days per week: None     Minutes per session: None     Stress: None   Relationships     Social connections:     Talks on phone: None     Gets together: None     Attends Anabaptism service: None     Active member of club or organization: None     Attends meetings of clubs or organizations: None     Relationship status: None     Intimate partner violence:     Fear of current or ex partner: None     Emotionally abused: None     Physically abused: None     Forced sexual activity: None   Other Topics Concern     Parent/sibling w/ CABG, MI or angioplasty before 65F 55M? Yes      Service Not Asked     Blood Transfusions Not Asked     Caffeine Concern Not Asked     Occupational Exposure Not Asked     Hobby Hazards Not Asked     Sleep Concern Not Asked     Stress Concern Not Asked     Weight Concern Not Asked     Special Diet Not Asked     Back Care Not Asked     Exercise Not Asked     Bike Helmet Yes     Seat Belt Yes     Self-Exams Not Asked   Social History Narrative     None       Review of Systems:  Skin:  Negative       Eyes:  Negative      ENT:  Negative      Respiratory:  Negative       Cardiovascular:    Positive for;chest pain occ - family hx strong for cad  Gastroenterology: Negative      Genitourinary:  Negative      Musculoskeletal:  Negative      Neurologic:  Negative      Psychiatric:  Negative      Heme/Lymph/Imm:  Negative      Endocrine:  Negative        Physical Exam:  Vitals: /79   Pulse 51   Ht 1.753 m (5' 9\")   Wt 79.4 kg (175 lb)   BMI 25.84 kg/m      Constitutional:  cooperative        Skin:  warm and dry " to the touch          Head:  normocephalic        Eyes:  pupils equal and round        Lymph:No Cervical lymphadenopathy present     ENT:           Neck:  carotid pulses are full and equal bilaterally        Respiratory:  clear to auscultation         Cardiac:                  pulses full and equal                                        GI:  abdomen soft        Extremities and Muscular Skeletal:  no edema              Neurological:  no gross motor deficits;affect appropriate        Psych:           CC  Gracie Wolff,   [address]  [place] [address]

## 2019-05-01 DIAGNOSIS — E78.00 HYPERCHOLESTEROLEMIA: ICD-10-CM

## 2019-05-01 RX ORDER — ROSUVASTATIN CALCIUM 20 MG/1
20 TABLET, COATED ORAL DAILY
Qty: 90 TABLET | Refills: 3 | Status: SHIPPED | OUTPATIENT
Start: 2019-05-01 | End: 2020-07-08

## 2019-06-12 ENCOUNTER — TELEPHONE (OUTPATIENT)
Dept: FAMILY MEDICINE | Facility: CLINIC | Age: 64
End: 2019-06-12

## 2019-06-12 NOTE — LETTER
June 12, 2019    Yannick Urena  [address]  [facility] [address]    Dear Marli Lanier cares about your health and your health plan.  I have reviewed your medical conditions, medication list and lab results, and am making recommendations based on this review to better manage your health.    You are in particular need of attention regarding:  -High Blood Pressure  -Wellness (Physical) Visit     I am recommending that you:     -schedule a WELLNESS (Physical) APPOINTMENT with me.   I will check fasting labs the same day - nothing to eat except water and meds for 8-10 hours prior. (If you go elsewhere for Wellness visits then please disregard this reminder.)      Please call us at the [email or web address] location:  [phone] or use [employer] to address the above recommendations.     Thank you for trusting [facility].  We appreciate the opportunity to serve you and look forward to supporting your healthcare in the future.    If you have (or plan to have) any of these tests done at a facility other than a [facility] or a [facility], please have the results sent to the [facility] location noted above.      Best Regards,    Gracie Wolff, DO

## 2019-06-12 NOTE — TELEPHONE ENCOUNTER
Panel Management Review      Patient has the following on his problem list:     Hypertension   Last three blood pressure readings:  BP Readings from Last 3 Encounters:   03/22/19 151/79   01/10/18 136/70   12/08/17 120/78     Blood pressure: MONITOR    HTN Guidelines:  Less than 140/90      Composite cancer screening  Chart review shows that this patient is due/due soon for the following Wellness Exam  Summary:    Patient is due/failing the following:   PHYSICAL    Action needed:   Patient needs office visit for Physical.    Type of outreach:    Sent letter.    Questions for provider review:    None                                                                                                                                    Katelynn Dickinson LPN     Chart routed to  .

## 2019-08-14 ENCOUNTER — TELEPHONE (OUTPATIENT)
Dept: FAMILY MEDICINE | Facility: CLINIC | Age: 64
End: 2019-08-14

## 2019-08-14 NOTE — LETTER
August 14, 2019    Yannick Urena  [address]  [facility] [address]    Dear Marli Lanier cares about your health and your health plan.  I have reviewed your medical conditions, medication list and lab results, and am making recommendations based on this review to better manage your health.    You are in particular need of attention regarding:  -High Blood Pressure  -Wellness (Physical) Visit     I am recommending that you:     -schedule a WELLNESS (Physical) APPOINTMENT with me.   I will check fasting labs the same day - nothing to eat except water and meds for 8-10 hours prior.      Please call us at the [facility] location:  [phone] or use [employer] to address the above recommendations.     Thank you for trusting [facility].  We appreciate the opportunity to serve you and look forward to supporting your healthcare in the future.    If you have (or plan to have) any of these tests done at a facility other than a [facility] or a [facility], please have the results sent to the [facility] location noted above.      Best Regards,    Gracie Wolff, DO

## 2019-08-14 NOTE — TELEPHONE ENCOUNTER
Panel Management Review      Patient has the following on his problem list:     Hypertension   Last three blood pressure readings:  BP Readings from Last 3 Encounters:   03/22/19 151/79   01/10/18 136/70   12/08/17 120/78     Blood pressure: MONITOR    HTN Guidelines:  Less than 140/90      Composite cancer screening  Chart review shows that this patient is due/due soon for the following None  Summary:    Patient is due/failing the following:   BP CHECK and PHYSICAL    Action needed:   Patient needs office visit for Physical/B/P recheck.    Type of outreach:    Sent letter.    Questions for provider review:    None                                                                                                                                    Katelynn Dickinson LPN     Chart routed to  .

## 2020-07-08 DIAGNOSIS — E78.00 HYPERCHOLESTEROLEMIA: ICD-10-CM

## 2020-07-08 DIAGNOSIS — I25.10 CORONARY ARTERY DISEASE INVOLVING NATIVE CORONARY ARTERY OF NATIVE HEART WITHOUT ANGINA PECTORIS: ICD-10-CM

## 2020-07-08 DIAGNOSIS — I10 BENIGN ESSENTIAL HYPERTENSION: ICD-10-CM

## 2020-07-08 RX ORDER — ROSUVASTATIN CALCIUM 20 MG/1
20 TABLET, COATED ORAL DAILY
Qty: 90 TABLET | Refills: 0 | Status: SHIPPED | OUTPATIENT
Start: 2020-07-08 | End: 2021-02-08

## 2020-07-08 RX ORDER — AMLODIPINE BESYLATE 5 MG/1
5 TABLET ORAL DAILY
Qty: 90 TABLET | Refills: 0 | Status: SHIPPED | OUTPATIENT
Start: 2020-07-08 | End: 2021-02-08

## 2020-10-28 ENCOUNTER — TELEPHONE (OUTPATIENT)
Dept: CARDIOLOGY | Facility: CLINIC | Age: 65
End: 2020-10-28

## 2020-11-29 ENCOUNTER — HOSPITAL ENCOUNTER (EMERGENCY)
Facility: CLINIC | Age: 65
Discharge: HOME | End: 2020-11-29
Attending: EMERGENCY MEDICINE | Admitting: EMERGENCY MEDICINE
Payer: COMMERCIAL

## 2020-11-29 ENCOUNTER — APPOINTMENT (OUTPATIENT)
Dept: ULTRASOUND IMAGING | Facility: CLINIC | Age: 65
End: 2020-11-29
Attending: EMERGENCY MEDICINE
Payer: COMMERCIAL

## 2020-11-29 VITALS
HEIGHT: 68 IN | OXYGEN SATURATION: 97 % | RESPIRATION RATE: 16 BRPM | TEMPERATURE: 97.9 F | WEIGHT: 170 LBS | BODY MASS INDEX: 25.76 KG/M2 | DIASTOLIC BLOOD PRESSURE: 84 MMHG | SYSTOLIC BLOOD PRESSURE: 183 MMHG | HEART RATE: 59 BPM

## 2020-11-29 DIAGNOSIS — I80.01 THROMBOPHLEBITIS OF SUPERFICIAL VEINS OF RIGHT LOWER EXTREMITY: ICD-10-CM

## 2020-11-29 PROCEDURE — 99284 EMERGENCY DEPT VISIT MOD MDM: CPT | Mod: 25

## 2020-11-29 PROCEDURE — 93971 EXTREMITY STUDY: CPT | Mod: RT

## 2020-11-29 ASSESSMENT — ENCOUNTER SYMPTOMS
SHORTNESS OF BREATH: 0
COUGH: 0
FEVER: 0

## 2020-11-29 ASSESSMENT — MIFFLIN-ST. JEOR: SCORE: 1525.61

## 2020-11-29 NOTE — ED AVS SNAPSHOT
[facility]  [address] [address] [address]  Phone: [phone]  Fax: [phone]                                    Yannick Urena   MRN: [id number]    Department: [facility]   Date of Visit: 11/29/2020           After Visit Summary Signature Page    I have received my discharge instructions, and my questions have been answered. I have discussed any challenges I see with this plan with the nurse or doctor.    ..........................................................................................................................................  Patient/Patient Representative Signature      ..........................................................................................................................................  Patient Representative Print Name and Relationship to Patient    ..................................................               ................................................  Date                                   Time    ..........................................................................................................................................  Reviewed by Signature/Title    ...................................................              ..............................................  Date                                               Time          22EPIC Rev 08/18

## 2020-11-30 NOTE — ED TRIAGE NOTES
Pain and swelling right lower leg x 1 day.    Area L Indication Text: Tumors in this location are included in Area L (trunk and extremities).  Mohs surgery is indicated for larger tumors, or tumors with aggressive histologic features, in these anatomic locations.

## 2020-11-30 NOTE — ED PROVIDER NOTES
"  History   Chief Complaint:  Leg Pain    HPI  Yannick Urena is a 65 year old male with a history of CAD, hypertension, and hyperlipidemia who presents for evaluation of leg pain. He reports pain and swelling to his right calf for the past day, and states he only came in because his wife wanted him to. He denies injury to the area but notes he just got back from [place], a 5 hour drive. He denies fever, cough, or shortness of breath.     Allergies:  Atorvastatin  Penicillins    Medications:    Crestor  Norvasc    Past Medical History:    Hypertension  CAD  Hyperlipidemia    Past Surgical History:    Dental extraction  Repair of nasal septum  Tonsillectomy      Family History:    Heart disease     Social History:  Marital Status:   Tobacco use: Never  Alcohol use: Yes  Drug use: No  PCP: Gracie Wolff DO    Review of Systems   Constitutional: Negative for fever.   Respiratory: Negative for cough and shortness of breath.    Cardiovascular: Positive for leg swelling (and pain).   All other systems reviewed and are negative.      Physical Exam     Patient Vitals for the past 24 hrs:   BP Temp Temp src Pulse Resp SpO2 Height Weight   11/29/20 2156 (!) 183/84 97.9  F (36.6  C) Temporal 59 16 97 % 1.727 m (5' 8\") 77.1 kg (170 lb)       Physical Exam  General: Alert, interactive   Head:  Scalp is atraumatic  Eyes:  The pupils are equal, round, and reactive to light    EOM's intact    No scleral icterus  ENT:      Nose:  The external nose is normal  Ears:  External ears are normal  Mouth/Throat: The oropharynx is normal    Mucus membranes are moist       Neck:  Normal range of motion.      There is no rigidity.    Trachea is in the midline         CV:  Regular rate and rhythm    No murmur   Resp:  Breath sounds are clear bilaterally    Non-labored, no retractions or accessory muscle use      GI:  Abdomen is soft, no distension, no tenderness.       MS:  Normal strength in all 4 extremities, 2+ DP pulse " on right    Swelling and mild tenderness in right medial calf  Skin:  Warm and dry, No rash or lesions noted.  Neuro: Strength 5/5 x4.  Sensation intact  In all 4 extremities.     Psych:  Awake. Alert.  Normal affect.      Appropriate interactions.    Emergency Department Course     Imaging:  Radiology findings were communicated with the patient who voiced understanding of the findings.    US LE Venous Duplex Right:  1.  No deep venous thrombosis in the right lower extremity.   2.  Superficial clot right greater saphenous vein, as per radiology.     Emergency Department Course:  Past medical records, nursing notes, and vitals reviewed.    2200: I performed an exam of the patient as documented above.     The patient was sent for a US while in the emergency department, results above.     2250: I rechecked the patient and discussed the results of his workup thus far.     Findings and plan explained to the Patient. Patient discharged home with instructions regarding supportive care, medications, and reasons to return. The importance of close follow-up was reviewed.     I personally reviewed the imaging results with the Patient and answered all related questions prior to discharge.     Impression & Plan     Medical Decision Making:  Mr Urena was seen and evaluated. The above workup was undertaken. US demonstrates superficial thrombophlebitis. This is not close to the deep system, and there are no signs of secondary infection or DVT. No signs of arterial occlusion. I have recommended aspirin 325 daily, NSAIDs for pain, compression stockings, and warm compresses. He will follow up with his PCP and will return here if new symptoms develop. Patient was in agreement with this plan and subsequently discharged to home.     Diagnosis:    ICD-10-CM    1. Thrombophlebitis of superficial veins of right lower extremity  I80.01        Disposition:  Discharged to home.    Scribe Disclosure:  Sofia COMBS, am serving as a  scribe at 10:01 PM on 11/29/2020 to document services personally performed by Sher Rangel MD based on my observations and the provider's statements to me.      Sher Rangel MD  11/29/20 7748

## 2021-02-08 ENCOUNTER — TELEPHONE (OUTPATIENT)
Dept: CARDIOLOGY | Facility: CLINIC | Age: 66
End: 2021-02-08

## 2021-02-08 DIAGNOSIS — E78.00 HYPERCHOLESTEROLEMIA: ICD-10-CM

## 2021-02-08 DIAGNOSIS — I25.10 CORONARY ARTERY DISEASE INVOLVING NATIVE CORONARY ARTERY OF NATIVE HEART WITHOUT ANGINA PECTORIS: ICD-10-CM

## 2021-02-08 DIAGNOSIS — I10 BENIGN ESSENTIAL HYPERTENSION: ICD-10-CM

## 2021-02-08 RX ORDER — AMLODIPINE BESYLATE 5 MG/1
5 TABLET ORAL DAILY
Qty: 90 TABLET | Refills: 0 | Status: SHIPPED | OUTPATIENT
Start: 2021-02-08 | End: 2021-03-17

## 2021-02-08 RX ORDER — ROSUVASTATIN CALCIUM 20 MG/1
20 TABLET, COATED ORAL DAILY
Qty: 90 TABLET | Refills: 0 | Status: SHIPPED | OUTPATIENT
Start: 2021-02-08 | End: 2021-05-14

## 2021-02-08 NOTE — TELEPHONE ENCOUNTER
Message from patient, he scheduled a visit with Dr. Villatoro for 3/17/2021. He ran out of refills in October and has been off of his meds since then. He did not call to discuss but just let the meds lapse. He wants a call back to decide what to do.  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    1341 Called patient to discuss but he did not answer. Left message that refills are sent for 90 days. Asked patient to call back to discuss updating lipids with OV and to see if he can check home BP readings.    2/9/2021: spoke with patient, he states he has a home BP monitor in the wrist style. His readings have been above 150 systolic. He will start the amlodipine tomorrow and check BP daily and keep a diary for Dr. Villatoro's visit on 3/17/2021. Patient will bring his wrist cuff to see if it is accurate.    Patient states he will start his crestor again tonight. Scheduled f/u lipid panel 3/15/2021 prior to the OV.

## 2021-03-10 ENCOUNTER — PRE VISIT (OUTPATIENT)
Dept: CARDIOLOGY | Facility: CLINIC | Age: 66
End: 2021-03-10

## 2021-03-10 DIAGNOSIS — R93.1 ELEVATED CORONARY ARTERY CALCIUM SCORE: Primary | ICD-10-CM

## 2021-03-15 DIAGNOSIS — R93.1 ELEVATED CORONARY ARTERY CALCIUM SCORE: ICD-10-CM

## 2021-03-15 LAB
CHOLEST SERPL-MCNC: 116 MG/DL
HDLC SERPL-MCNC: 46 MG/DL
LDLC SERPL CALC-MCNC: 34 MG/DL
NONHDLC SERPL-MCNC: 70 MG/DL
TRIGL SERPL-MCNC: 180 MG/DL

## 2021-03-15 PROCEDURE — 80061 LIPID PANEL: CPT | Performed by: INTERNAL MEDICINE

## 2021-03-15 PROCEDURE — 36415 COLL VENOUS BLD VENIPUNCTURE: CPT | Performed by: INTERNAL MEDICINE

## 2021-03-16 NOTE — PROGRESS NOTES
HPI and Plan:     I had the pleasure of seeing Mr. Urena in followup at the [facility] today. He is a very pleasant 65-year-old gentleman who I last saw in 03/2019.  He has a history of coronary artery disease based on a moderately elevated coronary artery calcium score in 2012.  At that time he was found to have a calcium score of 286.3 with a left main score of 37.9, LAD of 34.9, circumflex 64.5 and RCA of 159.  He also has a significant family history of premature coronary artery disease, hypertension and dyslipidemia.      He underwent an exercise stress perfusion study on 01/11/2018.  He exercised for 15 minutes according to the Darren protocol.  The stress EKG and nuclear perfusion study were negative for ischemia.      He presents today feeling well overall from a cardiovascular standpoint.  He specifically denies any chest discomfort, dyspnea on exertion, PND, orthopnea or lower extremity edema.  He denies any syncope or presyncope.  He remains very active, and has been bird hunting over the winter.    Unfortunately he was off his medications for several weeks due to issues with refills.  He has been back on them for the last month however.    He has been measuring his blood pressure at home and systolic blood pressures generally been elevated at 140 mmHg which corresponds with his blood pressure readings today.    PHYSICAL EXAMINATION:  Dictated below.      He underwent a fasting lipid panel today on 03/15/2021 demonstrated a cholesterol of 116, HDL of 46, LDL of 34, and triglycerides of 180.      IMPRESSION:   1.  Coronary artery disease based on an abnormal calcium score in 2012.  On aspirin 325 mg daily.  2.  Dyslipidemia.  Lipids are at goal on rosuvastatin 20 mg daily.  3.  Hypertension.  Not optimally controlled on amlodipine 5 mg daily.      The patient is doing well overall from a cardiovascular standpoint.  He does mention occasional nonexertional chest discomfort and  given his risk factors and family history, I have recommended repeat exercise stress perfusion imaging.    I have also asked him to increase his amlodipine to 5 mg twice daily.  He will continue keeping track of his blood pressure at home.    Assuming his clinical status remains stable, I will plan on follow-up in approximately 1 year.              CURRENT MEDICATIONS:  Current Outpatient Medications   Medication Sig Dispense Refill     amLODIPine (NORVASC) 5 MG tablet Take 1 tablet (5 mg) by mouth daily 90 tablet 0     aspirin 325 MG tablet Take 1 tablet by mouth daily.       Multiple Vitamin (MULTI-VITAMIN DAILY PO) Take  by mouth.       rosuvastatin (CRESTOR) 20 MG tablet Take 1 tablet (20 mg) by mouth daily 90 tablet 0       ALLERGIES     Allergies   Allergen Reactions     Atorvastatin Muscle Pain (Myalgia)     Penicillins      Unknown reaction     Seasonal Allergies        PAST MEDICAL HISTORY:  Past Medical History:   Diagnosis Date     Benign essential hypertension      Elevated coronary artery calcium score     total Agatston 2012-286 / 82nd percentile     Hyperlipidemia LDL goal <130        PAST SURGICAL HISTORY:  Past Surgical History:   Procedure Laterality Date     EXTRACTION(S) DENTAL  2013      REPAIR OF NASAL SEPTUM  1978     TONSILLECTOMY  1960's     wisdom teeth removal  age 14       FAMILY HISTORY:  Family History   Problem Relation Age of Onset     Heart Disease Father      Diabetes No family hx of        SOCIAL HISTORY:  Social History     Socioeconomic History     Marital status:      Spouse name: Not on file     Number of children: Not on file     Years of education: Not on file     Highest education level: Not on file   Occupational History     Employer: [employer]     Comment: service, howie   Social Needs     Financial resource strain: Not on file     Food insecurity     Worry: Not on file     Inability: Not on file     Transportation needs     Medical: Not on file      Non-medical: Not on file   Tobacco Use     Smoking status: Never Smoker     Smokeless tobacco: Never Used   Substance and Sexual Activity     Alcohol use: Yes     Comment: once a month     Drug use: No     Sexual activity: Yes     Partners: Female     Comment: wife menopause   Lifestyle     Physical activity     Days per week: Not on file     Minutes per session: Not on file     Stress: Not on file   Relationships     Social connections     Talks on phone: Not on file     Gets together: Not on file     Attends Christian service: Not on file     Active member of club or organization: Not on file     Attends meetings of clubs or organizations: Not on file     Relationship status: Not on file     Intimate partner violence     Fear of current or ex partner: Not on file     Emotionally abused: Not on file     Physically abused: Not on file     Forced sexual activity: Not on file   Other Topics Concern     Parent/sibling w/ CABG, MI or angioplasty before 65F 55M? Yes      Service Not Asked     Blood Transfusions Not Asked     Caffeine Concern Not Asked     Occupational Exposure Not Asked     Hobby Hazards Not Asked     Sleep Concern Not Asked     Stress Concern Not Asked     Weight Concern Not Asked     Special Diet Not Asked     Back Care Not Asked     Exercise Not Asked     Bike Helmet Yes     Seat Belt Yes     Self-Exams Not Asked   Social History Narrative     Not on file       Review of Systems:  Skin:          Eyes:         ENT:         Respiratory:          Cardiovascular:         Gastroenterology:        Genitourinary:         Musculoskeletal:         Neurologic:         Psychiatric:         Heme/Lymph/Imm:         Endocrine:           Physical Exam:  Vitals: There were no vitals taken for this visit.    Constitutional:  cooperative, alert and oriented, well developed, well nourished, in no acute distress        Skin:  warm and dry to the touch, no apparent skin lesions or masses noted          Head:            Eyes:           Lymph:      ENT:           Neck:  JVP normal        Respiratory:  clear to auscultation         Cardiac: regular rhythm                pulses full and equal, no bruits auscultated                                        GI:  abdomen soft        Extremities and Muscular Skeletal:  no edema              Neurological:           Psych:  Alert and Oriented x 3        CC  No referring provider defined for this encounter.

## 2021-03-17 ENCOUNTER — OFFICE VISIT (OUTPATIENT)
Dept: CARDIOLOGY | Facility: CLINIC | Age: 66
End: 2021-03-17
Payer: COMMERCIAL

## 2021-03-17 VITALS
BODY MASS INDEX: 27.58 KG/M2 | HEIGHT: 68 IN | SYSTOLIC BLOOD PRESSURE: 143 MMHG | DIASTOLIC BLOOD PRESSURE: 77 MMHG | HEART RATE: 61 BPM | WEIGHT: 182 LBS

## 2021-03-17 DIAGNOSIS — R07.2 PRECORDIAL PAIN: ICD-10-CM

## 2021-03-17 DIAGNOSIS — R93.1 ELEVATED CORONARY ARTERY CALCIUM SCORE: Primary | ICD-10-CM

## 2021-03-17 DIAGNOSIS — I10 BENIGN ESSENTIAL HYPERTENSION: ICD-10-CM

## 2021-03-17 DIAGNOSIS — I25.10 CORONARY ARTERY DISEASE INVOLVING NATIVE CORONARY ARTERY OF NATIVE HEART WITHOUT ANGINA PECTORIS: ICD-10-CM

## 2021-03-17 PROCEDURE — 99214 OFFICE O/P EST MOD 30 MIN: CPT | Performed by: INTERNAL MEDICINE

## 2021-03-17 RX ORDER — AMLODIPINE BESYLATE 5 MG/1
5 TABLET ORAL 2 TIMES DAILY
Qty: 180 TABLET | Refills: 3 | Status: SHIPPED | OUTPATIENT
Start: 2021-03-17 | End: 2022-06-21

## 2021-03-17 ASSESSMENT — MIFFLIN-ST. JEOR: SCORE: 1580.05

## 2021-03-17 NOTE — LETTER
3/17/2021    Gracie Wolff, DO  [address]  [place] [address]    RE: Yannick SWEETIE Urena       Dear Colleague,    I had the pleasure of seeing Yannick Urena in the [facility].    HPI and Plan:     I had the pleasure of seeing Mr. Urena in followup at the [facility] today. He is a very pleasant 65-year-old gentleman who I last saw in 03/2019.  He has a history of coronary artery disease based on a moderately elevated coronary artery calcium score in 2012.  At that time he was found to have a calcium score of 286.3 with a left main score of 37.9, LAD of 34.9, circumflex 64.5 and RCA of 159.  He also has a significant family history of premature coronary artery disease, hypertension and dyslipidemia.      He underwent an exercise stress perfusion study on 01/11/2018.  He exercised for 15 minutes according to the Darren protocol.  The stress EKG and nuclear perfusion study were negative for ischemia.      He presents today feeling well overall from a cardiovascular standpoint.  He specifically denies any chest discomfort, dyspnea on exertion, PND, orthopnea or lower extremity edema.  He denies any syncope or presyncope.  He remains very active, and has been bird hunting over the winter.    Unfortunately he was off his medications for several weeks due to issues with refills.  He has been back on them for the last month however.    He has been measuring his blood pressure at home and systolic blood pressures generally been elevated at 140 mmHg which corresponds with his blood pressure readings today.    PHYSICAL EXAMINATION:  Dictated below.      He underwent a fasting lipid panel today on 03/15/2021 demonstrated a cholesterol of 116, HDL of 46, LDL of 34, and triglycerides of 180.      IMPRESSION:   1.  Coronary artery disease based on an abnormal calcium score in 2012.  On aspirin 325 mg daily.  2.  Dyslipidemia.   Lipids are at goal on rosuvastatin 20 mg daily.  3.  Hypertension.  Not optimally controlled on amlodipine 5 mg daily.      The patient is doing well overall from a cardiovascular standpoint.  He does mention occasional nonexertional chest discomfort and given his risk factors and family history, I have recommended repeat exercise stress perfusion imaging.    I have also asked him to increase his amlodipine to 5 mg twice daily.  He will continue keeping track of his blood pressure at home.    Assuming his clinical status remains stable, I will plan on follow-up in approximately 1 year.              CURRENT MEDICATIONS:  Current Outpatient Medications   Medication Sig Dispense Refill     amLODIPine (NORVASC) 5 MG tablet Take 1 tablet (5 mg) by mouth daily 90 tablet 0     aspirin 325 MG tablet Take 1 tablet by mouth daily.       Multiple Vitamin (MULTI-VITAMIN DAILY PO) Take  by mouth.       rosuvastatin (CRESTOR) 20 MG tablet Take 1 tablet (20 mg) by mouth daily 90 tablet 0       ALLERGIES     Allergies   Allergen Reactions     Atorvastatin Muscle Pain (Myalgia)     Penicillins      Unknown reaction     Seasonal Allergies        PAST MEDICAL HISTORY:  Past Medical History:   Diagnosis Date     Benign essential hypertension      Elevated coronary artery calcium score     total Agatston 2012-286 / 82nd percentile     Hyperlipidemia LDL goal <130        PAST SURGICAL HISTORY:  Past Surgical History:   Procedure Laterality Date     EXTRACTION(S) DENTAL  2013      REPAIR OF NASAL SEPTUM  1978     TONSILLECTOMY  1960's     wisdom teeth removal  age 14       FAMILY HISTORY:  Family History   Problem Relation Age of Onset     Heart Disease Father      Diabetes No family hx of        SOCIAL HISTORY:  Social History     Socioeconomic History     Marital status:      Spouse name: Not on file     Number of children: Not on file     Years of education: Not on file     Highest education level: Not on file   Occupational  History     Employer: [employer]     Comment: service, [place]   Social Needs     Financial resource strain: Not on file     Food insecurity     Worry: Not on file     Inability: Not on file     Transportation needs     Medical: Not on file     Non-medical: Not on file   Tobacco Use     Smoking status: Never Smoker     Smokeless tobacco: Never Used   Substance and Sexual Activity     Alcohol use: Yes     Comment: once a month     Drug use: No     Sexual activity: Yes     Partners: Female     Comment: wife menopause   Lifestyle     Physical activity     Days per week: Not on file     Minutes per session: Not on file     Stress: Not on file   Relationships     Social connections     Talks on phone: Not on file     Gets together: Not on file     Attends Yazidi service: Not on file     Active member of club or organization: Not on file     Attends meetings of clubs or organizations: Not on file     Relationship status: Not on file     Intimate partner violence     Fear of current or ex partner: Not on file     Emotionally abused: Not on file     Physically abused: Not on file     Forced sexual activity: Not on file   Other Topics Concern     Parent/sibling w/ CABG, MI or angioplasty before 65F 55M? Yes      Service Not Asked     Blood Transfusions Not Asked     Caffeine Concern Not Asked     Occupational Exposure Not Asked     Hobby Hazards Not Asked     Sleep Concern Not Asked     Stress Concern Not Asked     Weight Concern Not Asked     Special Diet Not Asked     Back Care Not Asked     Exercise Not Asked     Bike Helmet Yes     Seat Belt Yes     Self-Exams Not Asked   Social History Narrative     Not on file       Review of Systems:  Skin:          Eyes:         ENT:         Respiratory:          Cardiovascular:         Gastroenterology:        Genitourinary:         Musculoskeletal:         Neurologic:         Psychiatric:         Heme/Lymph/Imm:         Endocrine:           Physical Exam:  Vitals:  There were no vitals taken for this visit.    Constitutional:  cooperative, alert and oriented, well developed, well nourished, in no acute distress        Skin:  warm and dry to the touch, no apparent skin lesions or masses noted          Head:           Eyes:           Lymph:      ENT:           Neck:  JVP normal        Respiratory:  clear to auscultation         Cardiac: regular rhythm                pulses full and equal, no bruits auscultated                                        GI:  abdomen soft        Extremities and Muscular Skeletal:  no edema              Neurological:           Psych:  Alert and Oriented x 3      Thank you for allowing me to participate in the care of your patient.      Sincerely,     Oscra Villatoro MD     [facility]    cc:   No referring provider defined for this encounter.

## 2021-05-14 DIAGNOSIS — E78.00 HYPERCHOLESTEROLEMIA: ICD-10-CM

## 2021-05-14 DIAGNOSIS — I25.10 CORONARY ARTERY DISEASE INVOLVING NATIVE CORONARY ARTERY OF NATIVE HEART WITHOUT ANGINA PECTORIS: ICD-10-CM

## 2021-05-14 RX ORDER — ROSUVASTATIN CALCIUM 20 MG/1
20 TABLET, COATED ORAL DAILY
Qty: 90 TABLET | Refills: 3 | Status: SHIPPED | OUTPATIENT
Start: 2021-05-14 | End: 2022-07-25

## 2021-08-18 ENCOUNTER — TRANSFERRED RECORDS (OUTPATIENT)
Dept: MULTI SPECIALTY CLINIC | Facility: CLINIC | Age: 66
End: 2021-08-18
Payer: COMMERCIAL

## 2021-10-27 ENCOUNTER — HOSPITAL ENCOUNTER (EMERGENCY)
Facility: CLINIC | Age: 66
Discharge: HOME | End: 2021-10-27
Attending: PHYSICIAN ASSISTANT | Admitting: PHYSICIAN ASSISTANT
Payer: COMMERCIAL

## 2021-10-27 ENCOUNTER — APPOINTMENT (OUTPATIENT)
Dept: GENERAL RADIOLOGY | Facility: CLINIC | Age: 66
End: 2021-10-27
Attending: PHYSICIAN ASSISTANT
Payer: COMMERCIAL

## 2021-10-27 VITALS
BODY MASS INDEX: 25.76 KG/M2 | HEART RATE: 60 BPM | RESPIRATION RATE: 18 BRPM | SYSTOLIC BLOOD PRESSURE: 166 MMHG | OXYGEN SATURATION: 96 % | HEIGHT: 68 IN | WEIGHT: 170 LBS | TEMPERATURE: 98.4 F | DIASTOLIC BLOOD PRESSURE: 66 MMHG

## 2021-10-27 DIAGNOSIS — U07.1 COVID-19: ICD-10-CM

## 2021-10-27 DIAGNOSIS — R03.0 ELEVATED BLOOD PRESSURE READING: ICD-10-CM

## 2021-10-27 PROCEDURE — 71045 X-RAY EXAM CHEST 1 VIEW: CPT

## 2021-10-27 PROCEDURE — 99283 EMERGENCY DEPT VISIT LOW MDM: CPT | Mod: 25

## 2021-10-27 ASSESSMENT — ENCOUNTER SYMPTOMS
DIARRHEA: 1
FATIGUE: 1
COUGH: 1
NAUSEA: 1
SHORTNESS OF BREATH: 0
FEVER: 1
APPETITE CHANGE: 1

## 2021-10-27 ASSESSMENT — MIFFLIN-ST. JEOR: SCORE: 1520.61

## 2021-10-27 NOTE — ED TRIAGE NOTES
Tested covid + yesterday after having a bad stomach ache on Sunday and loss of appetite with diarrhea and nausea, temp 101.7  Yesterday. C/o lower back pain today, similar to an old back injury. Home pulse ox showed 92%.

## 2021-10-28 NOTE — ED PROVIDER NOTES
"  History   Chief Complaint:  Covid Concern       HPI   Yannick Urena is a 66 year old male with history of hypertension and hyperlipidemia who presents with a cough and malaise and fatigue since Sunday evening. He was diagnosed with Covid-19 and was sent to the urgent care because his oxygen was 93%. He denies any continuous chest pain, shortness of breath, hemoptysis, swelling in his legs or calves, history of cancer, history of DVT or pulmonary embolism, or recent travel. He was mildly nauseous with a stomach ache and diarrhea over the weekend however that has since resolved. His wife wants us to look at his lungs. He has been having continuous low back pain since injury ing it even prior to his illness.     Review of Systems   Constitutional: Positive for appetite change (decrease), fatigue and fever.   Respiratory: Positive for cough. Negative for shortness of breath.    Cardiovascular: Negative for chest pain.   Gastrointestinal: Positive for diarrhea and nausea.   All other systems reviewed and are negative.      Allergies:  Atorvastatin  Penicillins  Seasonal Allergies    Medications:  Norvasc  Aspirin  Crestor    Past Medical History:     Hypertension  Elevated coronary artery calcium  Hyperlipidemia  Hallux limitus    Past Surgical History:    Tonsillectomy  [place]ak[place] teeth removal    Family History:    Heart disease - father    Social History:  The patient presents alone. He is a never smoker.    Physical Exam     Patient Vitals for the past 24 hrs:   BP Temp Temp src Pulse Resp SpO2 Height Weight   10/27/21 1850 (!) 166/66 98.4  F (36.9  C) Oral 60 18 96 % 1.727 m (5' 8\") 77.1 kg (170 lb)       Physical Exam  General: Well appearing, pleasant male, resting on exam bed  HEENT: No evidence of trauma.  Conjunctive are clear. Neck range of motion intact.  Nose and throat clear.  Respiratory: Good effort  Cardiovascular: Good distal perfusion  Gastrointestinal: Nondistended  Musculoskeletal: Atraumatic  Skin: " Exposed skin clear.  Neurologic: Alert.  Psych:  Patient is cooperative, with normal affect.  Limited exam was taken due to the patient's diagnosis.    Emergency Department Course     Imaging:  XR Chest Port 1 View:  No focal infiltrate, pleural effusion or pneumothorax. Normal heart size.  Report per radiology    Emergency Department Course:  Reviewed:  I reviewed nursing notes, vitals, past medical history and Care Everywhere    Assessments:  1910 I obtained history and examined the patient as noted above.     Disposition:  The patient was discharged to home.     Impression & Plan     Medical Decision Making:  Yannick Urena is a 66 year old male presents emergency room today for evaluation of Covid symptoms and a oxygen saturation of 93% from the urgent care. See HPI. He is hypertensive and has an oxygen saturation of 96 here. He has minor symptoms. He reports chest pain with coughing however no chest pain at rest, shortness of breath, hemoptysis, or other risk factors for pulmonary embolism. He was concerned about his lungs we completed a chest radiograph that was unremarkable. My impression is COVID-19. Less likely include PE, dissection, ACS, pericarditis, myocarditis, or other. He is wondering about ivermectin; this was discouraged. Symptomatic care is indicated and he is to return with new or worsening symptoms. No further questions and agrees with plan.    Diagnosis:    ICD-10-CM    1. COVID-19  U07.1    2. Elevated blood pressure reading  R03.0        Discharge Medications:  Discharge Medication List as of 10/27/2021  7:58 PM          Scribe Disclosure:  LAURYN, Carol Duffy, am serving as a scribe at 7:11 PM on 10/27/2021 to document services personally performed by Saleem Wiley PA-C based on my observations and the provider's statements to me.              Saleem Wiley PA-C  10/27/21 2101

## 2021-10-28 NOTE — DISCHARGE INSTRUCTIONS
Discharge Instructions  COVID-19    COVID-19 is the disease caused by a new coronavirus. The virus spreads from person-to-person primarily by droplets when an infected person coughs or sneezes and the droplets are then breathed in by another person. There are tests available to diagnose COVID-19. You may have been diagnosed with COVID, may be being tested for COVID and have a pending test result, or may have been exposed to COVID.    Symptoms of COVID-19  Many people have no symptoms or mild symptoms.  Symptoms may usually appear 4 to 5 days (up to 14 days) after contact with a person with COVID-19. Some people will get severe symptoms and pneumonia. Usual symptoms are:     ? Fever  ? Cough  ? Trouble breathing    Less common symptoms are: Headache, body aches, sore throat, sneezing, diarrhea, loss of taste or smell.    Isolation and Quarantine    You may have been seen because you have symptoms, had an exposure, or had some other concern about possible COVID. The best way to stop the spread of the virus is to avoid contact with others.    Isolation refers to sick people staying away from people who are not sick. A person in quarantine is limiting activity because they were exposed and are waiting to see if they might become sick.    If you test positive for COVID, you should stay home (isolation) for at least 10 days after your symptoms began, and for 24 hours with no fever and improvement of symptoms--whichever is longer. (Your fever should be gone for 24 hours without using fever-reducing medicine). If you have no symptoms, you should stay home (isolation) for 10 days from the day of the test. If you have been vaccinated for COVID, the vaccination will not cause you to test positive so a positive test result generally is a  true positive .    For example, if you have a fever and cough for 6 days, you need to stay home 4 more days with no fever for a total of 10 days. Or, if you have a fever and cough for 10 days,  you need to stay home one more day with no fever for a total of 11 days.    If you have a high-risk exposure to COVID (you spent 15 minutes or more within six feet of somebody who has COVID), you should stay home (quarantine) for 14 days, unless you are vaccinated. Even if you test negative for COVID, the CDC recommends a 14-day quarantine from the time of your last exposure to that individual (unless you are vaccinated). There are options for a shortened (<14 day quarantine) you can review at:  https://www.[email or web address]./diseases/coronavirus/close.html#long    If you live in the same house as somebody with COVID and cannot separate from them, you will need to quarantine for 14-days after that person's isolation (infectious) period. That means that you may need to quarantine for 24-days after that person became symptomatic/ill.    If you are vaccinated and do not develop symptoms, you do not need to quarantine after exposure.    If you have symptoms but a negative test, you should stay at home until you are symptom-free and without fever for 24 hours, using the same judgment you would for when it is safe to return to work/school from strep throat, influenza, or the common cold. If you worsen, you should consider being re-evaluated.    If you are being tested for COVID because of symptoms and your test is pending, you should stay home until you know your test result.    If I have COVID, how should I protect myself and others?    Do not go to work or school. Have a friend or relative do your shopping. Do not use public transportation (bus, train) or ridesharing (Lyft, Uber).    Separate yourself from other people in your home. As much as possible, you should stay in one room and away from other people in your home. Also, use a separate bathroom, if possible. Avoid handling pets or other animals while sick.     Wear a facemask if you need to be around other people and cover your mouth and nose with a tissue when  you cough or sneeze.     Avoid sharing personal household items. You should not share dishes, drinking glasses, forks/knives/spoons, towels, or bedding with other people in your home. After using these items, they should be washed with soap and water. Clean parts of your home that are touched often (doorknobs, faucets, countertops, etc.) daily.     Wash your hands often with soap and water for at least 20 seconds or use an alcohol-based hand  containing at least 60% alcohol.     Avoid touching your face.    Treat your symptoms. You can take Acetaminophen (Tylenol) to treat body aches and fever as needed for comfort. Ibuprofen (Advil or Motrin) can be used as well if you still have symptoms after taking Tylenol. Drink fluids. Rest.    Watch for worsening symptoms such as shortness of breath/difficulty breathing or very severe weakness.    Employers/workplaces are being asked by the Centers for Disease Control (CDC) to not request notes/documentation for you to return to work or prove that you were ill. You may choose to show your employer this paperwork. Also, repeat testing should not be required to return to work.    Exercise/Sports in rare cases, COVID could affect your heart in a way that makes exercise or participation in sports dangerous.    If you have a mild COVID illness (fever, cough, sore throat, and similar symptoms but no difficulty breathing or abnormalities of the lung): After your COVID symptoms have resolved, wait 14-days before returning to activity.  If you have more than a mild illness (meaning that you have problems with your breathing or lungs) or if you participate in competitive or strenuous activity or have a history of heart disease: Please see your primary doctor/provider prior to return to activity/competition.    Antibody treatments are available for patients with mild to moderate COVID illness in order to prevent severe illness. In general, only patients with risk factors for  severe illness are eligible for treatment. For more information, to see if you are eligible, and to find treatment, go to the [facility]:  [email or web address]./diseases/coronavirus/mnrap.html     Return to the Emergency Department if:    If you are developing worsening breathing, shortness of breath, or feel worse you should seek medical attention.  If you are uncertain, contact your health care provider/clinic. If you need emergency medical attention, call 911 and tell them you have been ill.

## 2021-11-04 ENCOUNTER — NURSE TRIAGE (OUTPATIENT)
Dept: FAMILY MEDICINE | Facility: CLINIC | Age: 66
End: 2021-11-04

## 2021-11-04 ENCOUNTER — VIRTUAL VISIT (OUTPATIENT)
Dept: FAMILY MEDICINE | Facility: CLINIC | Age: 66
End: 2021-11-04
Payer: COMMERCIAL

## 2021-11-04 DIAGNOSIS — R48.1 LOSS OF PERCEPTION FOR TASTE: ICD-10-CM

## 2021-11-04 DIAGNOSIS — U07.1 CLINICAL DIAGNOSIS OF COVID-19: Primary | ICD-10-CM

## 2021-11-04 PROCEDURE — 99203 OFFICE O/P NEW LOW 30 MIN: CPT | Mod: 95 | Performed by: FAMILY MEDICINE

## 2021-11-04 NOTE — TELEPHONE ENCOUNTER
Nurse Triage SBAR    Is this a 2nd Level Triage? NO    Situation    : patient thinks that he was exposed to COVID 19 while hunting in [place]. Symptoms started 10/24. Tested positive on 10/29. Worst symptom is that everything tastes like salt and he is having difficultly getting anything in except water.  Smalkl amounts of diarrhea. Denies chest pain or SOB. O2 sat 93 %.    Background    :  Patient was not vaccinated for COVID 19      Assessment   :  Patient states that he is improving, but hasn't had anything to eat in the last 7 days. Ate 1 egg yesterday. Can't even swallow Gator aid    (See information below for more triage details.)    Protocol Recommended Disposition: Urgent office follow-up appointment       Reason for Disposition    HIGH RISK for severe COVID complications (e.g., age > 64 years, obesity with BMI > 25, pregnant, chronic lung disease or other chronic medical condition)  (Exception: Already seen by PCP and no new or worsening symptoms.)    Additional Information    Negative: SEVERE difficulty breathing (e.g., struggling for each breath, speaks in single words)    Negative: Difficult to awaken or acting confused (e.g., disoriented, slurred speech)    Negative: Bluish (or gray) lips or face now    Negative: Shock suspected (e.g., cold/pale/clammy skin, too weak to stand, low BP, rapid pulse)    Negative: Sounds like a life-threatening emergency to the triager    Negative: MILD difficulty breathing (e.g., minimal/no SOB at rest, SOB with walking, pulse <100)    Negative: Chest pain or pressure    Negative: Patient sounds very sick or weak to the triager    Negative: SEVERE or constant chest pain or pressure (Exception: mild central chest pain, present only when coughing)    Negative: MODERATE difficulty breathing (e.g., speaks in phrases, SOB even at rest, pulse 100-120)    Negative: [1] Headache AND [2] stiff neck (can't touch chin to chest)    Negative: Fever > 103 F (39.4 C)    Negative: [1] Fever >  "101 F (38.3 C) AND [2] age > 60 years    Negative: [1] Fever > 100.0 F (37.8 C) AND [2] bedridden (e.g., nursing home patient, CVA, chronic illness, recovering from surgery)    Answer Assessment - Initial Assessment Questions  1. COVID-19 DIAGNOSIS: \"Who made your Coronavirus (COVID-19) diagnosis?\" \"Was it confirmed by a positive lab test?\" If not diagnosed by a HCP, ask \"Are there lots of cases (community spread) where you live?\" (See public health department website, if unsure)      Tested at site in [facility] lab  2. COVID-19 EXPOSURE: \"Was there any known exposure to COVID before the symptoms began?\" CDC Definition of close contact: within 6 feet (2 meters) for a total of 15 minutes or more over a 24-hour period.       Hunting in [place]  3. ONSET: \"When did the COVID-19 symptoms start?\"     10/24/21  4. WORST SYMPTOM: \"What is your worst symptom?\" (e.g., cough, fever, shortness of breath, muscle aches)      Loss of taste, everything tastes like salt, O2 sat 93-94 %  5. COUGH: \"Do you have a cough?\" If Yes, ask: \"How bad is the cough?\"        Cough, not that bad 4-5/10  6. FEVER: \"Do you have a fever?\" If Yes, ask: \"What is your temperature, how was it measured, and when did it start?\"      No fever  7. RESPIRATORY STATUS: \"Describe your breathing?\" (e.g., shortness of breath, wheezing, unable to speak)      Not really SOB  8. BETTER-SAME-WORSE: \"Are you getting better, staying the same or getting worse compared to yesterday?\"  If getting worse, ask, \"In what way?\"      Better today for sure.   9. HIGH RISK DISEASE: \"Do you have any chronic medical problems?\" (e.g., asthma, heart or lung disease, weak immune system, obesity, etc.)      none  10. PREGNANCY: \"Is there any chance you are pregnant?\" \"When was your last menstrual period?\"        NA  11. OTHER SYMPTOMS: \"Do you have any other symptoms?\"  (e.g., chills, fatigue, headache, loss of smell or taste, muscle pain, sore throat; new loss of smell or taste " especially support the diagnosis of COVID-19)        Loss of taste    Protocols used: CORONAVIRUS (COVID-19) DIAGNOSED OR [id number]-FR 8.25.2021    Kia Borjas RN

## 2021-11-04 NOTE — PROGRESS NOTES
"Yannick is a 66 year old who is being evaluated via a billable video visit.      How would you like to obtain your AVS?   If the video visit is dropped, the invitation should be resent by: Text to cell phone: [phone]  Will anyone else be joining your video visit? No      Video Start Time: 2:45    Assessment & Plan     Clinical diagnosis of COVID-19  Discussed with the patient regarding the clinical course of Covid.  Currently he denies any fever.  Most likely his symptoms will resolve in the next few days.  I told him there is no data which prove any benefits of ivermectin so I would not suggest taking that.  Loss of sense of taste and smell will improve with time.  Keep himself hydrated.  Symptomatic care discussed with the patient.    Loss of perception for taste             BMI:   Estimated body mass index is 25.85 kg/m  as calculated from the following:    Height as of 10/27/21: 1.727 m (5' 8\").    Weight as of 10/27/21: 77.1 kg (170 lb).           No follow-ups on file.    Phi Mills MD  [facility]    Dee Lanier is a 66 year old who presents for the following health issues    HPI       Concern for COVID-19  About how many days ago did these symptoms start? X test positive 10/25  Is this your first visit for this illness? Yes  In the 14 days before your symptoms started, have you had close contact with someone with COVID-19 (Coronavirus)? No  Do you have a fever or chills? No  Are you having new or worsening difficulty breathing? No  Do you have new or worsening cough? Yes, it's a dry cough.   Have you had any new or unexplained body aches? No    Have you experienced any of the following NEW symptoms?    Headache: No    Sore throat: No    Loss of taste or smell: YES- unable to eat because everything he eats tastes salty     Chest pain: No    Diarrhea: No    Rash: No  What treatments have you tried? Ivermectin not helping and abx  Who do you live with? Wife   Are you, or a " household member, a healthcare worker or a ? No  Do you live in a nursing home, group home, or shelter? No  Do you have a way to get food/medications if quarantined? NO          Review of Systems   Constitutional, HEENT, cardiovascular, pulmonary, gi and gu systems are negative, except as otherwise noted.      Objective           Vitals:  No vitals were obtained today due to virtual visit.    Physical Exam   GENERAL: Healthy, alert and no distress  EYES: Eyes grossly normal to inspection.  No discharge or erythema, or obvious scleral/conjunctival abnormalities.  RESP: No audible wheeze, cough, or visible cyanosis.  No visible retractions or increased work of breathing.    SKIN: Visible skin clear. No significant rash, abnormal pigmentation or lesions.  NEURO: Cranial nerves grossly intact.  Mentation and speech appropriate for age.  PSYCH: Mentation appears normal, affect normal/bright, judgement and insight intact, normal speech and appearance well-groomed.            Video-Visit Details    Type of service:  Video Visit    Video End Time:3:01 PM    Originating Location (pt. Location): Home    Distant Location (provider location):  [facility]     Platform used for Video Visit: VitoNanoledge

## 2022-02-22 ENCOUNTER — TELEPHONE (OUTPATIENT)
Dept: FAMILY MEDICINE | Facility: CLINIC | Age: 67
End: 2022-02-22
Payer: COMMERCIAL

## 2022-02-23 NOTE — TELEPHONE ENCOUNTER
Chart reviewed by Ambulatory Quality and Data team    Please abstract the following data from this visit with this patient into the appropriate field in Epic:    Tests that can be patient reported without a hard copy:        Other Tests found in the patient's chart through Chart Review/Care Everywhere:    Colonoscopy done by this group [facility] on this date: 8/18/21    Note to Abstraction: If this section is blank, no results were found via Chart Review/Care Everywhere.

## 2022-05-02 ENCOUNTER — OFFICE VISIT (OUTPATIENT)
Dept: URGENT CARE | Facility: URGENT CARE | Age: 67
End: 2022-05-02
Payer: COMMERCIAL

## 2022-05-02 VITALS
RESPIRATION RATE: 16 BRPM | DIASTOLIC BLOOD PRESSURE: 74 MMHG | TEMPERATURE: 98.6 F | SYSTOLIC BLOOD PRESSURE: 138 MMHG | WEIGHT: 170 LBS | HEART RATE: 61 BPM | BODY MASS INDEX: 25.85 KG/M2 | OXYGEN SATURATION: 98 %
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
## 2022-05-02 PROCEDURE — 99213 OFFICE O/P EST LOW 20 MIN: CPT | Performed by: FAMILY MEDICINE

## 2022-05-02 RX ORDER — DOXYCYCLINE 100 MG/1
200 TABLET ORAL DAILY
Qty: 2 TABLET | Refills: 0 | Status: SHIPPED | OUTPATIENT
Start: 2022-05-02 | End: 2022-05-03

## 2022-05-02 NOTE — PROGRESS NOTES
SUBJECTIVE: Yannick Urena is a 66 year old male presenting with a chief complaint of tick bite.  Onset of symptoms was 1 day(s) ago.  Removed tick from left cheek    Past Medical History:   Diagnosis Date     Benign essential hypertension      Elevated coronary artery calcium score     total Agatston 2012-286 / 82nd percentile     Hyperlipidemia LDL goal <130      Allergies   Allergen Reactions     Atorvastatin Muscle Pain (Myalgia)     Penicillins      Unknown reaction     Pollen Extract      Seasonal Allergies      Social History     Tobacco Use     Smoking status: Never Smoker     Smokeless tobacco: Never Used   Substance Use Topics     Alcohol use: Yes     Comment: once a month       ROS:  SKIN: no rash  GI: no vomiting    OBJECTIVE:  /74   Pulse 61   Temp 98.6  F (37  C) (Tympanic)   Resp 16   Wt 77.1 kg (170 lb)   SpO2 98%   BMI 25.85 kg/m  GENERAL APPEARANCE: healthy, alert and no distress  EYES: EOMI,  PERRL, conjunctiva clear  SKIN: small red papule under left eye      ICD-10-CM    1. Tick bite, unspecified site, initial encounter  W57.XXXA doxycycline monohydrate (ADOXA) 100 MG tablet       Fluids/Rest, f/u if worse/not any better

## 2022-06-09 ENCOUNTER — HOSPITAL ENCOUNTER (OUTPATIENT)
Dept: CT IMAGING | Facility: CLINIC | Age: 67
Discharge: HOME | End: 2022-06-09
Attending: PHYSICIAN ASSISTANT | Admitting: PHYSICIAN ASSISTANT
Payer: COMMERCIAL

## 2022-06-09 ENCOUNTER — OFFICE VISIT (OUTPATIENT)
Dept: PEDIATRICS | Facility: CLINIC | Age: 67
End: 2022-06-09
Attending: NURSE PRACTITIONER
Payer: COMMERCIAL

## 2022-06-09 ENCOUNTER — APPOINTMENT (OUTPATIENT)
Dept: ULTRASOUND IMAGING | Facility: CLINIC | Age: 67
End: 2022-06-09
Attending: EMERGENCY MEDICINE
Payer: COMMERCIAL

## 2022-06-09 ENCOUNTER — OFFICE VISIT (OUTPATIENT)
Dept: URGENT CARE | Facility: URGENT CARE | Age: 67
End: 2022-06-09
Payer: COMMERCIAL

## 2022-06-09 ENCOUNTER — HOSPITAL ENCOUNTER (OUTPATIENT)
Facility: CLINIC | Age: 67
Setting detail: OBSERVATION
Discharge: HOME | End: 2022-06-10
Attending: EMERGENCY MEDICINE | Admitting: SURGERY
Payer: COMMERCIAL

## 2022-06-09 VITALS
BODY MASS INDEX: 25.85 KG/M2 | WEIGHT: 170 LBS | OXYGEN SATURATION: 97 % | SYSTOLIC BLOOD PRESSURE: 152 MMHG | DIASTOLIC BLOOD PRESSURE: 73 MMHG | TEMPERATURE: 98 F | HEART RATE: 65 BPM | RESPIRATION RATE: 18 BRPM

## 2022-06-09 VITALS
WEIGHT: 170 LBS | TEMPERATURE: 98 F | OXYGEN SATURATION: 98 % | SYSTOLIC BLOOD PRESSURE: 147 MMHG | RESPIRATION RATE: 16 BRPM | BODY MASS INDEX: 25.85 KG/M2 | HEART RATE: 70 BPM | DIASTOLIC BLOOD PRESSURE: 75 MMHG

## 2022-06-09 DIAGNOSIS — R14.0 ABDOMINAL DISTENTION: ICD-10-CM

## 2022-06-09 DIAGNOSIS — K81.0 ACUTE CHOLECYSTITIS: Primary | ICD-10-CM

## 2022-06-09 DIAGNOSIS — D72.829 LEUKOCYTOSIS, UNSPECIFIED TYPE: ICD-10-CM

## 2022-06-09 DIAGNOSIS — R14.0 ABDOMINAL BLOATING: ICD-10-CM

## 2022-06-09 DIAGNOSIS — R10.11 RUQ ABDOMINAL PAIN: Primary | ICD-10-CM

## 2022-06-09 DIAGNOSIS — K81.0 ACUTE CHOLECYSTITIS: ICD-10-CM

## 2022-06-09 LAB
ALBUMIN SERPL-MCNC: 3.3 G/DL (ref 3.4–5)
ALBUMIN SERPL-MCNC: 3.8 G/DL (ref 3.4–5)
ALP SERPL-CCNC: 65 U/L (ref 40–150)
ALP SERPL-CCNC: 70 U/L (ref 40–150)
ALT SERPL W P-5'-P-CCNC: 18 U/L (ref 0–70)
ALT SERPL W P-5'-P-CCNC: 19 U/L (ref 0–70)
AMYLASE SERPL-CCNC: 38 U/L (ref 30–110)
ANION GAP SERPL CALCULATED.3IONS-SCNC: 5 MMOL/L (ref 3–14)
AST SERPL W P-5'-P-CCNC: 15 U/L (ref 0–45)
AST SERPL W P-5'-P-CCNC: 18 U/L (ref 0–45)
BASOPHILS # BLD AUTO: 0 10E3/UL (ref 0–0.2)
BASOPHILS NFR BLD AUTO: 0 %
BILIRUB DIRECT SERPL-MCNC: 0.3 MG/DL (ref 0–0.2)
BILIRUB SERPL-MCNC: 2.3 MG/DL (ref 0.2–1.3)
BILIRUB SERPL-MCNC: 2.4 MG/DL (ref 0.2–1.3)
BUN SERPL-MCNC: 19 MG/DL (ref 7–30)
CALCIUM SERPL-MCNC: 9.3 MG/DL (ref 8.5–10.1)
CHLORIDE BLD-SCNC: 104 MMOL/L (ref 94–109)
CO2 SERPL-SCNC: 29 MMOL/L (ref 20–32)
CREAT SERPL-MCNC: 1.08 MG/DL (ref 0.66–1.25)
EOSINOPHIL # BLD AUTO: 0.1 10E3/UL (ref 0–0.7)
EOSINOPHIL NFR BLD AUTO: 1 %
ERYTHROCYTE [DISTWIDTH] IN BLOOD BY AUTOMATED COUNT: 13.5 % (ref 10–15)
GFR SERPL CREATININE-BSD FRML MDRD: 75 ML/MIN/1.73M2
GLUCOSE BLD-MCNC: 100 MG/DL (ref 70–99)
HCT VFR BLD AUTO: 46.7 % (ref 40–53)
HGB BLD-MCNC: 15.8 G/DL (ref 13.3–17.7)
LIPASE SERPL-CCNC: 104 U/L (ref 73–393)
LIPASE SERPL-CCNC: 214 U/L (ref 73–393)
LYMPHOCYTES # BLD AUTO: 1.4 10E3/UL (ref 0.8–5.3)
LYMPHOCYTES NFR BLD AUTO: 13 %
MCH RBC QN AUTO: 29 PG (ref 26.5–33)
MCHC RBC AUTO-ENTMCNC: 33.8 G/DL (ref 31.5–36.5)
MCV RBC AUTO: 86 FL (ref 78–100)
MONOCYTES # BLD AUTO: 1.2 10E3/UL (ref 0–1.3)
MONOCYTES NFR BLD AUTO: 11 %
NEUTROPHILS # BLD AUTO: 8.5 10E3/UL (ref 1.6–8.3)
NEUTROPHILS NFR BLD AUTO: 75 %
PLATELET # BLD AUTO: 329 10E3/UL (ref 150–450)
POTASSIUM BLD-SCNC: 4.5 MMOL/L (ref 3.4–5.3)
PROT SERPL-MCNC: 6.9 G/DL (ref 6.8–8.8)
PROT SERPL-MCNC: 7.7 G/DL (ref 6.8–8.8)
RBC # BLD AUTO: 5.44 10E6/UL (ref 4.4–5.9)
SARS-COV-2 RNA RESP QL NAA+PROBE: NEGATIVE
SODIUM SERPL-SCNC: 138 MMOL/L (ref 133–144)
WBC # BLD AUTO: 11.3 10E3/UL (ref 4–11)

## 2022-06-09 PROCEDURE — C9803 HOPD COVID-19 SPEC COLLECT: HCPCS

## 2022-06-09 PROCEDURE — 36415 COLL VENOUS BLD VENIPUNCTURE: CPT | Performed by: NURSE PRACTITIONER

## 2022-06-09 PROCEDURE — 36415 COLL VENOUS BLD VENIPUNCTURE: CPT | Performed by: EMERGENCY MEDICINE

## 2022-06-09 PROCEDURE — 83690 ASSAY OF LIPASE: CPT | Performed by: EMERGENCY MEDICINE

## 2022-06-09 PROCEDURE — 83690 ASSAY OF LIPASE: CPT | Performed by: NURSE PRACTITIONER

## 2022-06-09 PROCEDURE — 258N000003 HC RX IP 258 OP 636: Performed by: PHYSICIAN ASSISTANT

## 2022-06-09 PROCEDURE — 82150 ASSAY OF AMYLASE: CPT | Performed by: NURSE PRACTITIONER

## 2022-06-09 PROCEDURE — 99207 REFERRAL TO ACUTE AND DIAGNOSTIC SERVICES: CPT | Performed by: NURSE PRACTITIONER

## 2022-06-09 PROCEDURE — 250N000009 HC RX 250: Performed by: RADIOLOGY

## 2022-06-09 PROCEDURE — 99215 OFFICE O/P EST HI 40 MIN: CPT | Performed by: PHYSICIAN ASSISTANT

## 2022-06-09 PROCEDURE — 99285 EMERGENCY DEPT VISIT HI MDM: CPT | Mod: 25

## 2022-06-09 PROCEDURE — 999N000127 HC STATISTIC PERIPHERAL IV START W US GUIDANCE

## 2022-06-09 PROCEDURE — 84075 ASSAY ALKALINE PHOSPHATASE: CPT | Mod: 59 | Performed by: NURSE PRACTITIONER

## 2022-06-09 PROCEDURE — 96368 THER/DIAG CONCURRENT INF: CPT

## 2022-06-09 PROCEDURE — 74177 CT ABD & PELVIS W/CONTRAST: CPT

## 2022-06-09 PROCEDURE — G0378 HOSPITAL OBSERVATION PER HR: HCPCS

## 2022-06-09 PROCEDURE — 76705 ECHO EXAM OF ABDOMEN: CPT

## 2022-06-09 PROCEDURE — 85025 COMPLETE CBC W/AUTO DIFF WBC: CPT | Performed by: NURSE PRACTITIONER

## 2022-06-09 PROCEDURE — 99220 PR INITIAL OBSERVATION CARE,LEVEL III: CPT | Performed by: PHYSICIAN ASSISTANT

## 2022-06-09 PROCEDURE — 96365 THER/PROPH/DIAG IV INF INIT: CPT

## 2022-06-09 PROCEDURE — 250N000011 HC RX IP 250 OP 636: Performed by: RADIOLOGY

## 2022-06-09 PROCEDURE — 80053 COMPREHEN METABOLIC PANEL: CPT | Performed by: NURSE PRACTITIONER

## 2022-06-09 PROCEDURE — 250N000011 HC RX IP 250 OP 636: Performed by: EMERGENCY MEDICINE

## 2022-06-09 PROCEDURE — U0003 INFECTIOUS AGENT DETECTION BY NUCLEIC ACID (DNA OR RNA); SEVERE ACUTE RESPIRATORY SYNDROME CORONAVIRUS 2 (SARS-COV-2) (CORONAVIRUS DISEASE [COVID-19]), AMPLIFIED PROBE TECHNIQUE, MAKING USE OF HIGH THROUGHPUT TECHNOLOGIES AS DESCRIBED BY CMS-2020-01-R: HCPCS | Performed by: EMERGENCY MEDICINE

## 2022-06-09 RX ORDER — CEFTRIAXONE 2 G/1
2 INJECTION, POWDER, FOR SOLUTION INTRAMUSCULAR; INTRAVENOUS ONCE
Status: COMPLETED | OUTPATIENT
Start: 2022-06-09 | End: 2022-06-09

## 2022-06-09 RX ORDER — PROCHLORPERAZINE MALEATE 5 MG
5 TABLET ORAL EVERY 6 HOURS PRN
Status: DISCONTINUED | OUTPATIENT
Start: 2022-06-09 | End: 2022-06-10 | Stop reason: HOSPADM

## 2022-06-09 RX ORDER — ACETAMINOPHEN 325 MG/1
650 TABLET ORAL EVERY 6 HOURS PRN
Status: DISCONTINUED | OUTPATIENT
Start: 2022-06-09 | End: 2022-06-10 | Stop reason: HOSPADM

## 2022-06-09 RX ORDER — ACETAMINOPHEN 500 MG
1000 TABLET ORAL EVERY 6 HOURS PRN
Status: DISCONTINUED | OUTPATIENT
Start: 2022-06-09 | End: 2022-06-09

## 2022-06-09 RX ORDER — IOPAMIDOL 755 MG/ML
500 INJECTION, SOLUTION INTRAVASCULAR ONCE
Status: COMPLETED | OUTPATIENT
Start: 2022-06-09 | End: 2022-06-09

## 2022-06-09 RX ORDER — PROCHLORPERAZINE 25 MG
12.5 SUPPOSITORY, RECTAL RECTAL EVERY 12 HOURS PRN
Status: DISCONTINUED | OUTPATIENT
Start: 2022-06-09 | End: 2022-06-10 | Stop reason: HOSPADM

## 2022-06-09 RX ORDER — DEXTROSE, SODIUM CHLORIDE, SODIUM LACTATE, POTASSIUM CHLORIDE, AND CALCIUM CHLORIDE 5; .6; .31; .03; .02 G/100ML; G/100ML; G/100ML; G/100ML; G/100ML
1000 INJECTION, SOLUTION INTRAVENOUS ONCE
Status: DISCONTINUED | OUTPATIENT
Start: 2022-06-09 | End: 2022-06-09

## 2022-06-09 RX ORDER — ONDANSETRON 2 MG/ML
4 INJECTION INTRAMUSCULAR; INTRAVENOUS EVERY 6 HOURS PRN
Status: DISCONTINUED | OUTPATIENT
Start: 2022-06-09 | End: 2022-06-09

## 2022-06-09 RX ORDER — IBUPROFEN 600 MG/1
600 TABLET, FILM COATED ORAL EVERY 6 HOURS PRN
Status: DISCONTINUED | OUTPATIENT
Start: 2022-06-09 | End: 2022-06-09

## 2022-06-09 RX ORDER — ONDANSETRON 2 MG/ML
4 INJECTION INTRAMUSCULAR; INTRAVENOUS EVERY 6 HOURS PRN
Status: DISCONTINUED | OUTPATIENT
Start: 2022-06-09 | End: 2022-06-10 | Stop reason: HOSPADM

## 2022-06-09 RX ORDER — ONDANSETRON 4 MG/1
4 TABLET, ORALLY DISINTEGRATING ORAL EVERY 6 HOURS PRN
Status: DISCONTINUED | OUTPATIENT
Start: 2022-06-09 | End: 2022-06-10 | Stop reason: HOSPADM

## 2022-06-09 RX ORDER — OXYCODONE HYDROCHLORIDE 5 MG/1
5 TABLET ORAL EVERY 4 HOURS PRN
Status: DISCONTINUED | OUTPATIENT
Start: 2022-06-09 | End: 2022-06-10 | Stop reason: HOSPADM

## 2022-06-09 RX ORDER — ACETAMINOPHEN 650 MG/1
650 SUPPOSITORY RECTAL EVERY 6 HOURS PRN
Status: DISCONTINUED | OUTPATIENT
Start: 2022-06-09 | End: 2022-06-10 | Stop reason: HOSPADM

## 2022-06-09 RX ORDER — HYDROMORPHONE HYDROCHLORIDE 1 MG/ML
.3-.5 INJECTION, SOLUTION INTRAMUSCULAR; INTRAVENOUS; SUBCUTANEOUS
Status: DISCONTINUED | OUTPATIENT
Start: 2022-06-09 | End: 2022-06-09

## 2022-06-09 RX ADMIN — DEXTROSE AND SODIUM CHLORIDE: 5; 900 INJECTION, SOLUTION INTRAVENOUS at 23:29

## 2022-06-09 RX ADMIN — SODIUM CHLORIDE 61 ML: 9 INJECTION, SOLUTION INTRAVENOUS at 13:52

## 2022-06-09 RX ADMIN — IOPAMIDOL 85 ML: 755 INJECTION, SOLUTION INTRAVENOUS at 13:50

## 2022-06-09 RX ADMIN — SODIUM CHLORIDE, SODIUM LACTATE, POTASSIUM CHLORIDE, CALCIUM CHLORIDE AND DEXTROSE MONOHYDRATE 1000 ML: 5; 600; 310; 30; 20 INJECTION, SOLUTION INTRAVENOUS at 20:35

## 2022-06-09 RX ADMIN — CEFTRIAXONE 2 G: 2 INJECTION, POWDER, FOR SOLUTION INTRAMUSCULAR; INTRAVENOUS at 19:49

## 2022-06-09 NOTE — Clinical Note
Thank you for your referral to the [facility], Patient was found to have acute cholecystitis with abnormal liver lesion.  Discussed with General surgery, patient to be admitted through the ED and have MR/CT of liver before being able to be scheduled for surgery. Thank you BEATRIZ Merchant PA-C

## 2022-06-09 NOTE — PROGRESS NOTES
Assessment & Plan     RUQ abdominal pain  - CBC with platelets and differential  - Comprehensive metabolic panel (BMP + Alb, Alk Phos, ALT, AST, Total. Bili, TP)  - Lipase  - Amylase  - CBC with platelets and differential  - Comprehensive metabolic panel (BMP + Alb, Alk Phos, ALT, AST, Total. Bili, TP)  - Lipase  - Amylase    WBC elevated  CMP, amylase and lipase pending  Refer to [facility] with bloating, elevated WBC and RUQ pain to r/o bharat vs other.       Referral to Acute and Diagnostic Services    [facility] has been contacted at [phone] ([place]) to confirm patient acceptance. The transition to Acute & Diagnostic Services Clinic has been discussed with patient, and he agrees with next level of care.  Patient understands that evaluation/treatment at [facility] typically takes significantly longer than in clinic/urgent care (>2 hours).          Special issues:  None          Referral placed: Yes  Patient has transportation arranged and will travel to the [facility] without delay: Yes  Patient aware not to eat or drink. Yes    The following provider has assessed this patient for intervention at [facility], and directed the patient for referral: MONI Pastor CNP, APRN CNP            Return in about 2 days (around 6/11/2022) for with regular provider if symptoms persist.    MONI Pastor CNP  [facility] ALBINO Lanier is a 67 year old male who presents to clinic today for the following health issues:  Chief Complaint   Patient presents with     Urgent Care     Feeling bloated abdominal pain  for 2 days , took miralax last night and then threw up      HPI      Abdominal Pain    Location: generalized   Radiation: None.    Pain character: aching, cramping and fullness,   Severity: 5 on a scale of 1-10.    Duration: 2 day(s)   Course of Illness: fluctuating.  Exacerbated by: eating  Relieved by:  "nothing.  Associated Symptoms: nausea, constipation and bloating.  Surgical History: none    Bloating x 2 days - worst last night  Tried miralax and bowel prep then vomited but was able to get stool and gas out after  Wasn't able to get his pants buckled yesterday he was so bloated  Eats a \"normal \" diet and admits to lots of fatty/fried foods.    No fevers or chills    Review of Systems  Constitutional, HEENT, cardiovascular, pulmonary, GI, , musculoskeletal, neuro, skin, endocrine and psych systems are negative, except as otherwise noted.      Objective    BP (!) 147/75   Pulse 70   Temp 98  F (36.7  C)   Resp 16   Wt 77.1 kg (170 lb)   SpO2 98%   BMI 25.85 kg/m    Physical Exam   GENERAL: healthy, alert and no distress  EYES: Eyes grossly normal to inspection, PERRL and conjunctivae and sclerae normal  HENT: ear canals and TM's normal, nose and mouth without ulcers or lesions  NECK: no adenopathy, no asymmetry, masses, or scars and thyroid normal to palpation  RESP: lungs clear to auscultation - no rales, rhonchi or wheezes  CV: regular rate and rhythm, normal S1 S2, no S3 or S4, no murmur, click or rub, no peripheral edema and peripheral pulses strong  ABDOMEN: tenderness generalized, worse over RUQ.  bowel sounds normal, no palpable or pulsatile masses and umbilicus normal  MS: no gross musculoskeletal defects noted, no edema  SKIN: no suspicious lesions or rashes  PSYCH: mentation appears normal, affect normal/bright    Results for orders placed or performed in visit on 06/09/22   Comprehensive metabolic panel (BMP + Alb, Alk Phos, ALT, AST, Total. Bili, TP)     Status: Abnormal   Result Value Ref Range    Sodium 138 133 - 144 mmol/L    Potassium 4.5 3.4 - 5.3 mmol/L    Chloride 104 94 - 109 mmol/L    Carbon Dioxide (CO2) 29 20 - 32 mmol/L    Anion Gap 5 3 - 14 mmol/L    Urea Nitrogen 19 7 - 30 mg/dL    Creatinine 1.08 0.66 - 1.25 mg/dL    Calcium 9.3 8.5 - 10.1 mg/dL    Glucose 100 (H) 70 - 99 mg/dL "    Alkaline Phosphatase 70 40 - 150 U/L    AST 18 0 - 45 U/L    ALT 19 0 - 70 U/L    Protein Total 7.7 6.8 - 8.8 g/dL    Albumin 3.8 3.4 - 5.0 g/dL    Bilirubin Total 2.4 (H) 0.2 - 1.3 mg/dL    GFR Estimate 75 >60 mL/min/1.73m2   Amylase     Status: Normal   Result Value Ref Range    Amylase 38 30 - 110 U/L   CBC with platelets and differential     Status: Abnormal   Result Value Ref Range    WBC Count 11.3 (H) 4.0 - 11.0 10e3/uL    RBC Count 5.44 4.40 - 5.90 10e6/uL    Hemoglobin 15.8 13.3 - 17.7 g/dL    Hematocrit 46.7 40.0 - 53.0 %    MCV 86 78 - 100 fL    MCH 29.0 26.5 - 33.0 pg    MCHC 33.8 31.5 - 36.5 g/dL    RDW 13.5 10.0 - 15.0 %    Platelet Count 329 150 - 450 10e3/uL    % Neutrophils 75 %    % Lymphocytes 13 %    % Monocytes 11 %    % Eosinophils 1 %    % Basophils 0 %    Absolute Neutrophils 8.5 (H) 1.6 - 8.3 10e3/uL    Absolute Lymphocytes 1.4 0.8 - 5.3 10e3/uL    Absolute Monocytes 1.2 0.0 - 1.3 10e3/uL    Absolute Eosinophils 0.1 0.0 - 0.7 10e3/uL    Absolute Basophils 0.0 0.0 - 0.2 10e3/uL   CBC with platelets and differential     Status: Abnormal    Narrative    The following orders were created for panel order CBC with platelets and differential.  Procedure                               Abnormality         Status                     ---------                               -----------         ------                     CBC with platelets and d...[[id number]]  Abnormal            Final result                 Please view results for these tests on the individual orders.

## 2022-06-09 NOTE — PROGRESS NOTES
"  Assessment & Plan     Leukocytosis, unspecified type    Secondary to Cholecystitis     Acute Cholecystitis with hepatic lesion    Patient found to have acute cholecystitis with elevated WBC, abdominal bloating and distention that has worsened over the last 5 days.      Discussed case with General Surgery, Dr. Kruse, at this time it was felt he needs to be admitted and have a more specialized MR/CT scan of liver to evaluate liver lesion abnormalities and common bile duct before being able to have surgery.  Per request of Generally Surgery, patient needs further evaluation of liver abnormality before he can be taken to surgery.  Due to the inability to directly admit as this time, patient will have to be seen through the ED and admitted and have MR/CT of liver done before consulting with general surgery    - Referral to Acute and Diagnostic Services (Day of diagnostic / First order acute)  - CT Abdomen Pelvis w Contrast; Future  - sodium chloride (PF) 0.9% PF flush 3 mL  - IV access    Abdominal distention    Patient has significant abdominal bloating and swelling, pain in RUQ that is ongoing and worsening.  Patient found to have cholecystitis     - CT Abdomen Pelvis w Contrast; Future  - sodium chloride (PF) 0.9% PF flush 3 mL  - IV access     BMI:   Estimated body mass index is 25.85 kg/m  as calculated from the following:    Height as of 10/27/21: 1.727 m (5' 8\").    Weight as of this encounter: 77.1 kg (170 lb).       CONSULTATION/REFERRAL to ED for admission and MR/CT of liver.    Tacos Mcneil, [facility], PA-C  [facility]    Results for orders placed or performed during the hospital encounter of 06/09/22   CT Abdomen Pelvis w Contrast     Status: None    Narrative    CT ABDOMEN PELVIS W CONTRAST 6/9/2022 1:58 PM    CLINICAL HISTORY: Abdominal distension; Nausea/vomiting; Leukocytosis,  unspecified type; Abdominal bloating; Abdominal distention    TECHNIQUE: CT scan of the abdomen and " pelvis was performed following  injection of IV contrast. Multiplanar reformats were obtained. Dose  reduction techniques were used.  CONTRAST: 85mL Isovue-370    COMPARISON: 6/11/2015    FINDINGS:   LOWER CHEST: Small hiatal hernia. The visualized lung bases are clear.    HEPATOBILIARY: In the right hepatic lobe at the dome small foci of  hypoenhancement measuring 5-9 mm with surrounding hyperenhancement of  the hepatic parenchyma (series 4, image 24). Otherwise, normal liver.  Circumferential wall thickening of the gallbladder with surrounding  inflammatory changes. No calcified gallstones. No biliary ductal  dilatation. Mildly enlarged 1.1 cm lymph node adjacent to the  gallbladder neck (series 4, image 50).    PANCREAS: Normal.    SPLEEN: Mild splenomegaly with spleen measuring 13.7 cm.    ADRENAL GLANDS: Normal.    KIDNEYS/BLADDER: Stable 1.3 cm hypodense lesion in the right kidney,  likely a cyst requiring no specific follow-up. No hydronephrosis or  suspicious renal masses. Small urinary bladder diverticulum anterior  to the left ureterovesical junction measuring 1.8 cm.    BOWEL: No small bowel or colonic obstruction or inflammatory changes.  Normal appendix. Colonic diverticulosis.    PELVIC ORGANS: Mild prostatomegaly.    ADDITIONAL FINDINGS: Small amount of free fluid in the right lower  quadrant and in the pelvis. No abdominal aortic aneurysm. No abscess  or free air.    MUSCULOSKELETAL: Normal.      Impression    IMPRESSION:   1.  Findings suspicious for acute cholecystitis. No calcified  gallstones. Recommend surgical consultation. Consider right upper  quadrant ultrasound for confirmation if clinically indicated.  2.  Few punctate hypodense foci in the right hepatic lobe at the dome  with surrounding hyperenhancement of the liver parenchyma. Findings  are indeterminate for underlying developing phlegmon, microabscesses  or mass. A liver MRI is recommended for complete characterization.  3.  Mildly  enlarged lymph node adjacent to the gallbladder neck is  nonspecific and may be reactive.  4.  Mild prostatomegaly.  5.  Mild splenomegaly.    SONAL MATHEW MD         SYSTEM ID:  [id number]   Results for orders placed or performed in visit on 06/09/22   Comprehensive metabolic panel (BMP + Alb, Alk Phos, ALT, AST, Total. Bili, TP)     Status: Abnormal   Result Value Ref Range    Sodium 138 133 - 144 mmol/L    Potassium 4.5 3.4 - 5.3 mmol/L    Chloride 104 94 - 109 mmol/L    Carbon Dioxide (CO2) 29 20 - 32 mmol/L    Anion Gap 5 3 - 14 mmol/L    Urea Nitrogen 19 7 - 30 mg/dL    Creatinine 1.08 0.66 - 1.25 mg/dL    Calcium 9.3 8.5 - 10.1 mg/dL    Glucose 100 (H) 70 - 99 mg/dL    Alkaline Phosphatase 70 40 - 150 U/L    AST 18 0 - 45 U/L    ALT 19 0 - 70 U/L    Protein Total 7.7 6.8 - 8.8 g/dL    Albumin 3.8 3.4 - 5.0 g/dL    Bilirubin Total 2.4 (H) 0.2 - 1.3 mg/dL    GFR Estimate 75 >60 mL/min/1.73m2   Lipase     Status: Normal   Result Value Ref Range    Lipase 104 73 - 393 U/L   Amylase     Status: Normal   Result Value Ref Range    Amylase 38 30 - 110 U/L   CBC with platelets and differential     Status: Abnormal   Result Value Ref Range    WBC Count 11.3 (H) 4.0 - 11.0 10e3/uL    RBC Count 5.44 4.40 - 5.90 10e6/uL    Hemoglobin 15.8 13.3 - 17.7 g/dL    Hematocrit 46.7 40.0 - 53.0 %    MCV 86 78 - 100 fL    MCH 29.0 26.5 - 33.0 pg    MCHC 33.8 31.5 - 36.5 g/dL    RDW 13.5 10.0 - 15.0 %    Platelet Count 329 150 - 450 10e3/uL    % Neutrophils 75 %    % Lymphocytes 13 %    % Monocytes 11 %    % Eosinophils 1 %    % Basophils 0 %    Absolute Neutrophils 8.5 (H) 1.6 - 8.3 10e3/uL    Absolute Lymphocytes 1.4 0.8 - 5.3 10e3/uL    Absolute Monocytes 1.2 0.0 - 1.3 10e3/uL    Absolute Eosinophils 0.1 0.0 - 0.7 10e3/uL    Absolute Basophils 0.0 0.0 - 0.2 10e3/uL   CBC with platelets and differential     Status: Abnormal    Narrative    The following orders were created for panel order CBC with platelets and  differential.  Procedure                               Abnormality         Status                     ---------                               -----------         ------                     CBC with platelets and d...[[id number]]  Abnormal            Final result                 Please view results for these tests on the individual orders.       Dee Lanier is a 67 year old who presents for the following health issues     HPI     Abdominal/Flank Pain  Onset/Duration: X 5 days  Description:   Character: Dull ache  Location: right upper quadrant right lower quadrant  Radiation: None  Intensity: 7/10  Progression of Symptoms:  worsening  Accompanying Signs & Symptoms:  Fever/Chills: no   Gas/Bloating: YES- bloating  Nausea: YES  Vomiting: YES- X 2 episodes yesterday  Diarrhea: YES- but notes taking Miralax  Constipation: no   Dysuria or Hematuria: no   History:   Trauma: no   Previous similar pain: no   Previous tests done: YES- labs today  Previous Abdominal Surgery: no   Precipitating factors:   Does the pain change with:     Food: no appetite     Bowel Movement: no     Urination: no    Other factors:  no   Therapies tried and outcome: Miralax and Enema, neither has changes symptoms     When did you eat last: Half an egg at 07:30 MA today, little water.      Review of Systems   Constitutional, HEENT, cardiovascular, pulmonary, GI, , musculoskeletal, neuro, skin, endocrine and psych systems are negative, except as otherwise noted.      Objective    Wt 77.1 kg (170 lb)   BMI 25.85 kg/m    Body mass index is 25.85 kg/m .  Physical Exam   GENERAL: healthy, alert and no distress  EYES: Eyes grossly normal to inspection, PERRL and conjunctivae and sclerae normal  HENT: ear canals and TM's normal, nose and mouth without ulcers or lesions  NECK: no adenopathy, no asymmetry, masses, or scars and thyroid normal to palpation  RESP: lungs clear to auscultation - no rales, rhonchi or wheezes  CV: regular rate and rhythm,  normal S1 S2, no S3 or S4, no murmur, click or rub, no peripheral edema and peripheral pulses strong  ABDOMEN: tenderness RUQ, abdominal bloating and distention are present  MS: Positive for right side abdominal tenderness  SKIN: no suspicious lesions or rashes  NEURO: Normal strength and tone, mentation intact and speech normal

## 2022-06-10 ENCOUNTER — ANESTHESIA (OUTPATIENT)
Dept: SURGERY | Facility: CLINIC | Age: 67
End: 2022-06-10
Payer: COMMERCIAL

## 2022-06-10 ENCOUNTER — ANESTHESIA EVENT (OUTPATIENT)
Dept: SURGERY | Facility: CLINIC | Age: 67
End: 2022-06-10
Payer: COMMERCIAL

## 2022-06-10 VITALS
RESPIRATION RATE: 15 BRPM | BODY MASS INDEX: 25.76 KG/M2 | OXYGEN SATURATION: 92 % | WEIGHT: 170 LBS | TEMPERATURE: 97.1 F | SYSTOLIC BLOOD PRESSURE: 134 MMHG | HEIGHT: 68 IN | DIASTOLIC BLOOD PRESSURE: 70 MMHG | HEART RATE: 73 BPM

## 2022-06-10 LAB
ALBUMIN SERPL-MCNC: 3.1 G/DL (ref 3.4–5)
ALP SERPL-CCNC: 60 U/L (ref 40–150)
ALT SERPL W P-5'-P-CCNC: 16 U/L (ref 0–70)
ANION GAP SERPL CALCULATED.3IONS-SCNC: 5 MMOL/L (ref 3–14)
AST SERPL W P-5'-P-CCNC: 15 U/L (ref 0–45)
BILIRUB SERPL-MCNC: 2.2 MG/DL (ref 0.2–1.3)
BUN SERPL-MCNC: 21 MG/DL (ref 7–30)
CALCIUM SERPL-MCNC: 8.9 MG/DL (ref 8.5–10.1)
CHLORIDE BLD-SCNC: 106 MMOL/L (ref 94–109)
CO2 SERPL-SCNC: 27 MMOL/L (ref 20–32)
CREAT SERPL-MCNC: 0.92 MG/DL (ref 0.66–1.25)
ERYTHROCYTE [DISTWIDTH] IN BLOOD BY AUTOMATED COUNT: 13.2 % (ref 10–15)
GFR SERPL CREATININE-BSD FRML MDRD: >90 ML/MIN/1.73M2
GLUCOSE BLD-MCNC: 88 MG/DL (ref 70–99)
HCT VFR BLD AUTO: 44.4 % (ref 40–53)
HGB BLD-MCNC: 14.8 G/DL (ref 13.3–17.7)
MCH RBC QN AUTO: 28.7 PG (ref 26.5–33)
MCHC RBC AUTO-ENTMCNC: 33.3 G/DL (ref 31.5–36.5)
MCV RBC AUTO: 86 FL (ref 78–100)
PLATELET # BLD AUTO: 318 10E3/UL (ref 150–450)
POTASSIUM BLD-SCNC: 3.7 MMOL/L (ref 3.4–5.3)
PROT SERPL-MCNC: 6.7 G/DL (ref 6.8–8.8)
RBC # BLD AUTO: 5.15 10E6/UL (ref 4.4–5.9)
SODIUM SERPL-SCNC: 138 MMOL/L (ref 133–144)
WBC # BLD AUTO: 8 10E3/UL (ref 4–11)

## 2022-06-10 PROCEDURE — 258N000003 HC RX IP 258 OP 636: Performed by: ANESTHESIOLOGY

## 2022-06-10 PROCEDURE — 47562 LAPAROSCOPIC CHOLECYSTECTOMY: CPT | Mod: AS | Performed by: PHYSICIAN ASSISTANT

## 2022-06-10 PROCEDURE — 36415 COLL VENOUS BLD VENIPUNCTURE: CPT | Performed by: PHYSICIAN ASSISTANT

## 2022-06-10 PROCEDURE — 85027 COMPLETE CBC AUTOMATED: CPT | Performed by: PHYSICIAN ASSISTANT

## 2022-06-10 PROCEDURE — 272N000001 HC OR GENERAL SUPPLY STERILE: Performed by: SURGERY

## 2022-06-10 PROCEDURE — 710N000012 HC RECOVERY PHASE 2, PER MINUTE: Performed by: SURGERY

## 2022-06-10 PROCEDURE — 370N000017 HC ANESTHESIA TECHNICAL FEE, PER MIN: Performed by: SURGERY

## 2022-06-10 PROCEDURE — 710N000009 HC RECOVERY PHASE 1, LEVEL 1, PER MIN: Performed by: SURGERY

## 2022-06-10 PROCEDURE — 250N000009 HC RX 250: Performed by: SURGERY

## 2022-06-10 PROCEDURE — G0378 HOSPITAL OBSERVATION PER HR: HCPCS

## 2022-06-10 PROCEDURE — 99204 OFFICE O/P NEW MOD 45 MIN: CPT | Mod: 57 | Performed by: SURGERY

## 2022-06-10 PROCEDURE — 258N000001 HC RX 258: Performed by: SURGERY

## 2022-06-10 PROCEDURE — 80053 COMPREHEN METABOLIC PANEL: CPT | Performed by: PHYSICIAN ASSISTANT

## 2022-06-10 PROCEDURE — 360N000076 HC SURGERY LEVEL 3, PER MIN: Performed by: SURGERY

## 2022-06-10 PROCEDURE — 47562 LAPAROSCOPIC CHOLECYSTECTOMY: CPT | Performed by: SURGERY

## 2022-06-10 PROCEDURE — 250N000011 HC RX IP 250 OP 636: Performed by: NURSE ANESTHETIST, CERTIFIED REGISTERED

## 2022-06-10 PROCEDURE — 250N000009 HC RX 250: Performed by: NURSE ANESTHETIST, CERTIFIED REGISTERED

## 2022-06-10 PROCEDURE — 88304 TISSUE EXAM BY PATHOLOGIST: CPT | Mod: TC | Performed by: SURGERY

## 2022-06-10 PROCEDURE — 999N000141 HC STATISTIC PRE-PROCEDURE NURSING ASSESSMENT: Performed by: SURGERY

## 2022-06-10 RX ORDER — BUPIVACAINE HYDROCHLORIDE 5 MG/ML
INJECTION, SOLUTION EPIDURAL; INTRACAUDAL PRN
Status: DISCONTINUED | OUTPATIENT
Start: 2022-06-10 | End: 2022-06-10 | Stop reason: HOSPADM

## 2022-06-10 RX ORDER — MEPERIDINE HYDROCHLORIDE 25 MG/ML
12.5 INJECTION INTRAMUSCULAR; INTRAVENOUS; SUBCUTANEOUS
Status: DISCONTINUED | OUTPATIENT
Start: 2022-06-10 | End: 2022-06-10 | Stop reason: HOSPADM

## 2022-06-10 RX ORDER — ONDANSETRON 2 MG/ML
4 INJECTION INTRAMUSCULAR; INTRAVENOUS EVERY 30 MIN PRN
Status: DISCONTINUED | OUTPATIENT
Start: 2022-06-10 | End: 2022-06-10 | Stop reason: HOSPADM

## 2022-06-10 RX ORDER — OXYCODONE HYDROCHLORIDE 5 MG/1
5 TABLET ORAL EVERY 4 HOURS PRN
Status: DISCONTINUED | OUTPATIENT
Start: 2022-06-10 | End: 2022-06-10 | Stop reason: HOSPADM

## 2022-06-10 RX ORDER — HYDROMORPHONE HCL IN WATER/PF 6 MG/30 ML
0.4 PATIENT CONTROLLED ANALGESIA SYRINGE INTRAVENOUS EVERY 5 MIN PRN
Status: DISCONTINUED | OUTPATIENT
Start: 2022-06-10 | End: 2022-06-10 | Stop reason: HOSPADM

## 2022-06-10 RX ORDER — SODIUM CHLORIDE, SODIUM LACTATE, POTASSIUM CHLORIDE, CALCIUM CHLORIDE 600; 310; 30; 20 MG/100ML; MG/100ML; MG/100ML; MG/100ML
INJECTION, SOLUTION INTRAVENOUS CONTINUOUS
Status: DISCONTINUED | OUTPATIENT
Start: 2022-06-10 | End: 2022-06-10 | Stop reason: HOSPADM

## 2022-06-10 RX ORDER — LIDOCAINE HYDROCHLORIDE 10 MG/ML
INJECTION, SOLUTION INFILTRATION; PERINEURAL PRN
Status: DISCONTINUED | OUTPATIENT
Start: 2022-06-10 | End: 2022-06-10

## 2022-06-10 RX ORDER — ONDANSETRON 2 MG/ML
INJECTION INTRAMUSCULAR; INTRAVENOUS PRN
Status: DISCONTINUED | OUTPATIENT
Start: 2022-06-10 | End: 2022-06-10

## 2022-06-10 RX ORDER — INDOCYANINE GREEN AND WATER 25 MG
2.5 KIT INJECTION ONCE
Status: COMPLETED | OUTPATIENT
Start: 2022-06-10 | End: 2022-06-10

## 2022-06-10 RX ORDER — OXYCODONE HYDROCHLORIDE 5 MG/1
5-10 TABLET ORAL EVERY 4 HOURS PRN
Qty: 12 TABLET | Refills: 0 | Status: SHIPPED | OUTPATIENT
Start: 2022-06-10

## 2022-06-10 RX ORDER — OXYCODONE HYDROCHLORIDE 5 MG/1
5 TABLET ORAL
Status: DISCONTINUED | OUTPATIENT
Start: 2022-06-10 | End: 2022-06-10 | Stop reason: HOSPADM

## 2022-06-10 RX ORDER — LIDOCAINE 40 MG/G
CREAM TOPICAL
Status: DISCONTINUED | OUTPATIENT
Start: 2022-06-10 | End: 2022-06-10 | Stop reason: HOSPADM

## 2022-06-10 RX ORDER — NEOSTIGMINE METHYLSULFATE 1 MG/ML
VIAL (ML) INJECTION PRN
Status: DISCONTINUED | OUTPATIENT
Start: 2022-06-10 | End: 2022-06-10

## 2022-06-10 RX ORDER — CLINDAMYCIN PHOSPHATE 900 MG/50ML
INJECTION, SOLUTION INTRAVENOUS PRN
Status: DISCONTINUED | OUTPATIENT
Start: 2022-06-10 | End: 2022-06-10

## 2022-06-10 RX ORDER — IBUPROFEN 200 MG
600 TABLET ORAL EVERY 6 HOURS PRN
COMMUNITY
Start: 2022-06-10

## 2022-06-10 RX ORDER — ACETAMINOPHEN 325 MG/1
650 TABLET ORAL
Status: DISCONTINUED | OUTPATIENT
Start: 2022-06-10 | End: 2022-06-10 | Stop reason: HOSPADM

## 2022-06-10 RX ORDER — METOPROLOL TARTRATE 1 MG/ML
1-2 INJECTION, SOLUTION INTRAVENOUS EVERY 5 MIN PRN
Status: DISCONTINUED | OUTPATIENT
Start: 2022-06-10 | End: 2022-06-10 | Stop reason: HOSPADM

## 2022-06-10 RX ORDER — ONDANSETRON 4 MG/1
4 TABLET, ORALLY DISINTEGRATING ORAL EVERY 30 MIN PRN
Status: DISCONTINUED | OUTPATIENT
Start: 2022-06-10 | End: 2022-06-10 | Stop reason: HOSPADM

## 2022-06-10 RX ORDER — KETOROLAC TROMETHAMINE 15 MG/ML
15 INJECTION, SOLUTION INTRAMUSCULAR; INTRAVENOUS EVERY 6 HOURS PRN
Status: DISCONTINUED | OUTPATIENT
Start: 2022-06-10 | End: 2022-06-10 | Stop reason: HOSPADM

## 2022-06-10 RX ORDER — FENTANYL CITRATE 50 UG/ML
50 INJECTION, SOLUTION INTRAMUSCULAR; INTRAVENOUS
Status: DISCONTINUED | OUTPATIENT
Start: 2022-06-10 | End: 2022-06-10 | Stop reason: HOSPADM

## 2022-06-10 RX ORDER — HYDRALAZINE HYDROCHLORIDE 20 MG/ML
2.5-5 INJECTION INTRAMUSCULAR; INTRAVENOUS EVERY 10 MIN PRN
Status: DISCONTINUED | OUTPATIENT
Start: 2022-06-10 | End: 2022-06-10 | Stop reason: HOSPADM

## 2022-06-10 RX ORDER — FENTANYL CITRATE 50 UG/ML
INJECTION, SOLUTION INTRAMUSCULAR; INTRAVENOUS PRN
Status: DISCONTINUED | OUTPATIENT
Start: 2022-06-10 | End: 2022-06-10

## 2022-06-10 RX ORDER — ACETAMINOPHEN 500 MG
1000 TABLET ORAL EVERY 6 HOURS PRN
COMMUNITY
Start: 2022-06-10

## 2022-06-10 RX ORDER — FENTANYL CITRATE 50 UG/ML
50 INJECTION, SOLUTION INTRAMUSCULAR; INTRAVENOUS EVERY 5 MIN PRN
Status: DISCONTINUED | OUTPATIENT
Start: 2022-06-10 | End: 2022-06-10 | Stop reason: HOSPADM

## 2022-06-10 RX ORDER — DEXAMETHASONE SODIUM PHOSPHATE 10 MG/ML
INJECTION, SOLUTION INTRAMUSCULAR; INTRAVENOUS PRN
Status: DISCONTINUED | OUTPATIENT
Start: 2022-06-10 | End: 2022-06-10

## 2022-06-10 RX ORDER — PROPOFOL 10 MG/ML
INJECTION, EMULSION INTRAVENOUS PRN
Status: DISCONTINUED | OUTPATIENT
Start: 2022-06-10 | End: 2022-06-10

## 2022-06-10 RX ORDER — GLYCOPYRROLATE 0.2 MG/ML
INJECTION, SOLUTION INTRAMUSCULAR; INTRAVENOUS PRN
Status: DISCONTINUED | OUTPATIENT
Start: 2022-06-10 | End: 2022-06-10

## 2022-06-10 RX ADMIN — SODIUM CHLORIDE, POTASSIUM CHLORIDE, SODIUM LACTATE AND CALCIUM CHLORIDE: 600; 310; 30; 20 INJECTION, SOLUTION INTRAVENOUS at 08:13

## 2022-06-10 RX ADMIN — FENTANYL CITRATE 100 MCG: 50 INJECTION, SOLUTION INTRAMUSCULAR; INTRAVENOUS at 08:22

## 2022-06-10 RX ADMIN — GLYCOPYRROLATE 0.2 MG: 0.2 INJECTION, SOLUTION INTRAMUSCULAR; INTRAVENOUS at 08:49

## 2022-06-10 RX ADMIN — DEXAMETHASONE SODIUM PHOSPHATE 4 MG: 10 INJECTION, SOLUTION INTRAMUSCULAR; INTRAVENOUS at 08:22

## 2022-06-10 RX ADMIN — GLYCOPYRROLATE 0.4 MG: 0.2 INJECTION, SOLUTION INTRAMUSCULAR; INTRAVENOUS at 09:31

## 2022-06-10 RX ADMIN — LIDOCAINE HYDROCHLORIDE 50 MG: 10 INJECTION, SOLUTION INFILTRATION; PERINEURAL at 08:22

## 2022-06-10 RX ADMIN — CLINDAMYCIN PHOSPHATE 900 MG: 900 INJECTION, SOLUTION INTRAVENOUS at 08:41

## 2022-06-10 RX ADMIN — HYDROMORPHONE HYDROCHLORIDE 1 MG: 1 INJECTION, SOLUTION INTRAMUSCULAR; INTRAVENOUS; SUBCUTANEOUS at 08:54

## 2022-06-10 RX ADMIN — INDOCYANINE GREEN AND WATER 2.5 MG: KIT at 07:48

## 2022-06-10 RX ADMIN — ROCURONIUM BROMIDE 50 MG: 50 INJECTION, SOLUTION INTRAVENOUS at 08:22

## 2022-06-10 RX ADMIN — ONDANSETRON HYDROCHLORIDE 4 MG: 2 INJECTION, SOLUTION INTRAVENOUS at 09:28

## 2022-06-10 RX ADMIN — PROPOFOL 200 MG: 10 INJECTION, EMULSION INTRAVENOUS at 08:22

## 2022-06-10 RX ADMIN — NEOSTIGMINE METHYLSULFATE 3 MG: 1 INJECTION, SOLUTION INTRAVENOUS at 09:31

## 2022-06-10 NOTE — ANESTHESIA POSTPROCEDURE EVALUATION
Patient: Yannick Urena    Procedure: Procedure(s):  Laparoscopic cholecystectomy       Anesthesia Type:  General    Note:  Disposition: Outpatient   Postop Pain Control: Uneventful            Sign Out: Well controlled pain   PONV: No   Neuro/Psych: Uneventful            Sign Out: Acceptable/Baseline neuro status   Airway/Respiratory: Uneventful            Sign Out: Acceptable/Baseline resp. status   CV/Hemodynamics: Uneventful            Sign Out: Acceptable CV status; No obvious hypovolemia; No obvious fluid overload   Other NRE: NONE   DID A NON-ROUTINE EVENT OCCUR? No           Last vitals:  Vitals Value Taken Time   /74 06/10/22 1030   Temp 97.7  F (36.5  C) 06/10/22 0945   Pulse 67 06/10/22 1034   Resp 12 06/10/22 1034   SpO2 91 % 06/10/22 1034   Vitals shown include unvalidated device data.    Electronically Signed By: Nino Mao MD  Joanne 10, 2022  11:02 AM

## 2022-06-10 NOTE — ANESTHESIA PROCEDURE NOTES
Airway       Patient location during procedure: OR       Procedure Start/Stop Times: 6/10/2022 8:27 AM  Staff -        CRNA: Jaqueline Bustillo APRN CRNA       Performed By: CRNA  Consent for Airway        Urgency: elective  Indications and Patient Condition       Indications for airway management: los-procedural       Induction type:intravenous       Mask difficulty assessment: 1 - vent by mask    Final Airway Details       Final airway type: endotracheal airway       Successful airway: ETT - single  Endotracheal Airway Details        ETT size (mm): 8.0       Cuffed: yes       Successful intubation technique: direct laryngoscopy and video laryngoscopy       VL Blade Size: Glidescope 4       Grade View of Cords: 1       Adjucts: stylet       Position: Right       Measured from: gums/teeth       Secured at (cm): 24       Bite block used: None    Post intubation assessment        Placement verified by: capnometry, equal breath sounds and chest rise        Number of attempts at approach: 2 (Grade 3 view with Lucero 2.  Easy intubation with glidescope)       Number of other approaches attempted: 0       Secured with: plastic tape       Ease of procedure: easy       Dentition: Intact and Unchanged    Medication(s) Administered   Medication Administration Time: 6/10/2022 8:27 AM

## 2022-06-10 NOTE — ED NOTES
[facility]  ED Nurse Handoff Report    Yannick Urena is a 67 year old male   ED Chief complaint: Cholecystitis  . ED Diagnosis:   Final diagnoses:   Acute cholecystitis     Allergies:   Allergies   Allergen Reactions     Atorvastatin Muscle Pain (Myalgia)     Penicillins      Unknown reaction     Pollen Extract      Seasonal Allergies        Code Status: Full Code  Activity level - Baseline/Home:  Independent. Activity Level - Current:   Stand by Assist. Lift room needed: No. Bariatric: No   Needed: No   Isolation: No. Infection: Not Applicable.     Vital Signs:   Vitals:    06/09/22 1520 06/09/22 1815   BP: (!) 154/77 (!) 140/73   Pulse: 71 64   Resp: 20    Temp: 98.3  F (36.8  C)    TempSrc: Temporal    SpO2: 97% 97%       Cardiac Rhythm:  ,      Pain level:    Patient confused: No. Patient Falls Risk: Yes.   Elimination Status: Has voided   Patient Report - Initial Complaint: Cholecystitis.   Focused Assessment: Gastrointestinal - Gastrointestinal WDL: .WDL except; all  GI Signs/Symptoms: abdominal discomfort; nausea (Pt complains of RLQ pain that started last Sunday. Pt states pain became unbearable today. Pt complains of nausea. Pt states he is still urinating normally and has normal BMs. Pt sent here from clinic for cholecystitis. )   Tests Performed: labs, imaging. Abnormal Results:   Labs Ordered and Resulted from Time of ED Arrival to Time of ED Departure   HEPATIC FUNCTION PANEL - Abnormal       Result Value    Bilirubin Total 2.3 (*)     Bilirubin Direct 0.3 (*)     Protein Total 6.9      Albumin 3.3 (*)     Alkaline Phosphatase 65      AST 15      ALT 18     LIPASE - Normal    Lipase 214     COVID-19 VIRUS (CORONAVIRUS) BY PCR - Normal    SARS CoV2 PCR Negative       US Abdomen Limited   Final Result   IMPRESSION:   1.  Gallstones with gallbladder wall thickening and pericholecystic fluid. Negative sonographic Bashir sign, correlate for acute cholecystitis.              .    Treatments provided: See MAR  Family Comments: Wife in Room.   OBS brochure/video discussed/provided to patient:  Yes  ED Medications:   Medications   cefTRIAXone (ROCEPHIN) 2 g vial to attach to  ml bag for ADULTS or NS 50 ml bag for PEDS (has no administration in time range)     Drips infusing:  Yes  For the majority of the shift, the patient's behavior Green. Interventions performed were NA.    Sepsis treatment initiated: No     Patient tested for COVID 19 prior to admission: YES    ED Nurse Name/Phone Number: Renate Logan RN,   7:42 PM

## 2022-06-10 NOTE — PLAN OF CARE
Patient alert and oriented, up independently in room and hallway. Denies pain, but abdomen is tender to palpation on right side. IV infusing D5 0.9%NS @100ml/hr in right AC. VSS. PT has been NPO since midnight. General surgery is consulted. Continue with current plan of care.

## 2022-06-10 NOTE — H&P
[facility]  Outpatient/Observation Unit  Admission History and Physical Examination    NAME: Yannick Urena   : 1955   MRN: [id number]     Date of Admission:  2022    Assessment & Plan   Yannick Urena is a 67 year old male with PMHx significant for HTN, HLD, who was admitted on 2022 with acute cholecystitis.    Acute Cholecystitis   Pt presented with 4 days progressive RUQ abdominal pain, n/v, low PO intake. Afebrile. Labs with bilirubin mildly elevated, alk phos and transaminases within range. Slight leukocytosis. Imaging demonstrates gallstones with gallbladder wall thickening common duct measuring 3.8 mm.  Given a dose of Rocephin in the ED and admitted to observation.  Is medically optimized for surgery with RCRI of 0.  -Consult general surgery for cholecystectomy  -N.p.o. after midnight  -IV fluids  -Repeat CMP, CBC with a.m. labs  -antiemetics, analgesia PRN    HTN - hold amlodipine.   HLD - hold statin, obs status. Resume @ discharge.       Awaiting formal pharmacy reconciliation to resume home medications.     DVT Prophylaxis: Ambulate every shift  Code Status: Full Code as discussed with the patient on admission.    Clinically Significant Risk Factors Present on Admission       # Platelet Defect: home medication list includes an antiplatelet medication      Expected Discharge/Location: home, 6/10/2022      Lizette Powers PA-C    Primary Care Physician   Physician No Ref-Primary    Chief Complaint   Abdominal pain    History is obtained from the patient.    History of Present Illness   Yannick Urena is a 67 year old male who presents with abdominal pain.  Mr. Urena initially developed abdominal pain the evening of 2022.  His abdominal pain is located in the right side of his abdomen, described as bloating.  At onset his abdominal pain was keeping him from sleep.  He stated that his symptoms worsened throughout the week.  He developed worsening bloating feeling like  "\"had a basketball on his belly \".  He tried MiraLAX to see if he was constipated.  Prior to presentation today his pain became more severe so he presented to urgent care and was referred to the emergency department.  He reports that he has had a low appetite due to his pain and \"has not eaten for 3 days\".  He had an episode of emesis last night.  He has not had fever or chills.  He did have some loose stool following administration of MiraLAX.  Denies any changes in urine.  He was planning to go up to the cabin this weekend but came to the ED today for concern of his symptoms.  He denies any prior surgeries in his abdomen.  He reports being physically active at baseline and has been walking and mowing lawns recently without any chest pain or shortness of breath.    Discussed with Dr. Hazel in the ED, full chart review including lab work, imaging, and vital signs were reviewed. Patient received Rocephin in the ED.  Dr. Hazel spoke with Dr. Kruse from general surgery who recommended admission to hospitalist service, would not be able to perform cholecystectomy tonight.      Past Medical History    I have reviewed this patient's medical history and updated it with pertinent information if needed.   Past Medical History:   Diagnosis Date     Benign essential hypertension      Elevated coronary artery calcium score     total Agatston 2012-286 / 82nd percentile     Hyperlipidemia LDL goal <130        Past Surgical History   I have reviewed this patient's surgical history and updated it with pertinent information if needed.  Past Surgical History:   Procedure Laterality Date     EXTRACTION(S) DENTAL  2013      REPAIR OF NASAL SEPTUM  1978     TONSILLECTOMY  1960's     wisdom teeth removal  age 14       Prior to Admission Medications   Prior to Admission Medications   Prescriptions Last Dose Informant Patient Reported? Taking?   Multiple Vitamin (MULTI-VITAMIN DAILY PO)   Yes No   Sig: Take  by mouth.   amLODIPine " (NORVASC) 5 MG tablet   No No   Sig: Take 1 tablet (5 mg) by mouth 2 times daily   aspirin 325 MG tablet   Yes No   Sig: Take 1 tablet by mouth daily   rosuvastatin (CRESTOR) 20 MG tablet   No No   Sig: Take 1 tablet (20 mg) by mouth daily      Facility-Administered Medications Last Administration Doses Remaining   sodium chloride (PF) 0.9% PF flush 3 mL None recorded         Allergies   Allergies   Allergen Reactions     Atorvastatin Muscle Pain (Myalgia)     Penicillins      Unknown reaction     Pollen Extract      Seasonal Allergies        Social History   I have reviewed this patient's social history and updated it with pertinent information if needed. Yannick Urena resides with wife.  Denies tobacco use history.  He reports occasional alcohol use described as 1 beer monthly.    Family History   I have reviewed this patient's family history and updated it with pertinent information if needed.   Family History   Problem Relation Age of Onset     Heart Disease Father      Diabetes No family hx of        Review of Systems   The 10 point Review of Systems is negative other than noted in the HPI or here.     Physical Exam   Temp: 98.3  F (36.8  C) Temp src: Temporal BP: (!) 140/73 Pulse: 64   Resp: 20 SpO2: 97 %      Vital Signs with Ranges  Temp:  [98  F (36.7  C)-98.3  F (36.8  C)] 98.3  F (36.8  C)  Pulse:  [64-71] 64  Resp:  [16-20] 20  BP: (140-154)/(73-77) 140/73  SpO2:  [97 %-98 %] 97 %  0 lbs 0 oz    Constitutional: Awake, alert,  no apparent distress.  Eyes: Conjunctiva and pupils examined and normal.  HEENT: Moist mucous membranes, normal dentition.  Respiratory: Clear to auscultation bilaterally, no crackles or wheezing.  Cardiovascular: Regular rate and rhythm, normal S1 and S2, and no murmur noted.  GI: Soft, very slightly distended, right upper quadrant tenderness to light palpation, bowel sounds present. No rebound tenderness or guarding.  Lymph/Hematologic: No anterior cervical or supraclavicular  adenopathy.  Skin: No rashes, no cyanosis, no edema.  Musculoskeletal: No deformities noted.  No erythema or tenderness. Moving all extremities.  Neurologic: No focal deficits noted. Speech is clear. Coordination and strength grossly normal.   Psychiatric: Appropriate affect.    Data   Data reviewed today:      Imaging:   Recent Results (from the past 24 hour(s))   CT Abdomen Pelvis w Contrast    Narrative    CT ABDOMEN PELVIS W CONTRAST 6/9/2022 1:58 PM    CLINICAL HISTORY: Abdominal distension; Nausea/vomiting; Leukocytosis,  unspecified type; Abdominal bloating; Abdominal distention    TECHNIQUE: CT scan of the abdomen and pelvis was performed following  injection of IV contrast. Multiplanar reformats were obtained. Dose  reduction techniques were used.  CONTRAST: 85mL Isovue-370    COMPARISON: 6/11/2015    FINDINGS:   LOWER CHEST: Small hiatal hernia. The visualized lung bases are clear.    HEPATOBILIARY: In the right hepatic lobe at the dome small foci of  hypoenhancement measuring 5-9 mm with surrounding hyperenhancement of  the hepatic parenchyma (series 4, image 24). Otherwise, normal liver.  Circumferential wall thickening of the gallbladder with surrounding  inflammatory changes. No calcified gallstones. No biliary ductal  dilatation. Mildly enlarged 1.1 cm lymph node adjacent to the  gallbladder neck (series 4, image 50).    PANCREAS: Normal.    SPLEEN: Mild splenomegaly with spleen measuring 13.7 cm.    ADRENAL GLANDS: Normal.    KIDNEYS/BLADDER: Stable 1.3 cm hypodense lesion in the right kidney,  likely a cyst requiring no specific follow-up. No hydronephrosis or  suspicious renal masses. Small urinary bladder diverticulum anterior  to the left ureterovesical junction measuring 1.8 cm.    BOWEL: No small bowel or colonic obstruction or inflammatory changes.  Normal appendix. Colonic diverticulosis.    PELVIC ORGANS: Mild prostatomegaly.    ADDITIONAL FINDINGS: Small amount of free fluid in the right  lower  quadrant and in the pelvis. No abdominal aortic aneurysm. No abscess  or free air.    MUSCULOSKELETAL: Normal.      Impression    IMPRESSION:   1.  Findings suspicious for acute cholecystitis. No calcified  gallstones. Recommend surgical consultation. Consider right upper  quadrant ultrasound for confirmation if clinically indicated.  2.  Few punctate hypodense foci in the right hepatic lobe at the dome  with surrounding hyperenhancement of the liver parenchyma. Findings  are indeterminate for underlying developing phlegmon, microabscesses  or mass. A liver MRI is recommended for complete characterization.  3.  Mildly enlarged lymph node adjacent to the gallbladder neck is  nonspecific and may be reactive.  4.  Mild prostatomegaly.  5.  Mild splenomegaly.    SONAL MATHEW MD         SYSTEM ID:  [id number]   US Abdomen Limited    Narrative    EXAM: US ABDOMEN LIMITED  LOCATION: [facility]  DATE/TIME: 6/9/2022 5:47 PM    INDICATION: RUQ pain, vomiting  COMPARISON: None.  TECHNIQUE: Limited abdominal ultrasound.    FINDINGS:    GALLBLADDER: Gallstones. Gallbladder wall thickening. Trace pericholecystic fluid. Negative sonographic Bashir sign.    BILE DUCTS: No biliary dilatation. The common duct measures 3.8 mm.    LIVER: Normal parenchyma with smooth contour. No focal mass.    RIGHT KIDNEY: No hydronephrosis. 1.4 x 1.5 x 1.5 cm simple right cysts, no follow-up required.    PANCREAS: The visualized portions are normal.    No ascites.      Impression    IMPRESSION:  1.  Gallstones with gallbladder wall thickening and pericholecystic fluid. Negative sonographic Bashir sign, correlate for acute cholecystitis.           Recent Labs   Lab 06/09/22  1814 06/09/22  1114   WBC  --  11.3*   HGB  --  15.8   MCV  --  86   PLT  --  329   NA  --  138   POTASSIUM  --  4.5   CHLORIDE  --  104   CO2  --  29   BUN  --  19   CR  --  1.08   ANIONGAP  --  5   MAIK  --  9.3   GLC  --  100*   ALBUMIN 3.3* 3.8    PROTTOTAL 6.9 7.7   BILITOTAL 2.3* 2.4*   ALKPHOS 65 70   ALT 18 19   AST 15 18   LIPASE 214 104             Lizette Powers PA-C  [facility]  June 9, 2022

## 2022-06-10 NOTE — PROGRESS NOTES
Worked w/ pt while boarded in ED. VSS, pt on room air. Up ad rosemarie. Denies pain. IV running D5 LR. Alert and oriented x 4. Likely surgery tomorrow.

## 2022-06-10 NOTE — ANESTHESIA PREPROCEDURE EVALUATION
Anesthesia Pre-Procedure Evaluation    Patient: Yannick Urena   MRN: [id number] : 1955        Procedure : Procedure(s):  Laparoscopic cholecystectomy          Past Medical History:   Diagnosis Date     Benign essential hypertension      Elevated coronary artery calcium score     total Agatston 2012-286 / 82nd percentile     Hyperlipidemia LDL goal <130       Past Surgical History:   Procedure Laterality Date     EXTRACTION(S) DENTAL        REPAIR OF NASAL SEPTUM  1978     TONSILLECTOMY  's     wisdom teeth removal  age 14      Allergies   Allergen Reactions     Atorvastatin Muscle Pain (Myalgia)     Penicillins      Unknown reaction     Pollen Extract      Seasonal Allergies       Social History     Tobacco Use     Smoking status: Never Smoker     Smokeless tobacco: Never Used   Substance Use Topics     Alcohol use: Yes     Comment: once a month      Wt Readings from Last 1 Encounters:   22 77.1 kg (170 lb)        Anesthesia Evaluation   Pt has had prior anesthetic.         ROS/MED HX  ENT/Pulmonary:    (-) sleep apnea   Neurologic:       Cardiovascular:     (+) Dyslipidemia hypertension--CAD ---    METS/Exercise Tolerance:     Hematologic:       Musculoskeletal:       GI/Hepatic:     (+) cholecystitis/cholelithiasis,  (-) GERD   Renal/Genitourinary:       Endo:       Psychiatric/Substance Use:       Infectious Disease:       Malignancy:       Other:            Physical Exam    Airway        Mallampati: II   TM distance: > 3 FB   Neck ROM: full     Respiratory Devices and Support         Dental           Cardiovascular          Rhythm and rate: regular and normal     Pulmonary           breath sounds clear to auscultation           OUTSIDE LABS:  CBC:   Lab Results   Component Value Date    WBC 11.3 (H) 2022    WBC 5.6 2015    HGB 15.8 2022    HGB 16.0 2015    HCT 46.7 2022    HCT 46.4 2015     2022     2015     BMP:   Lab  Results   Component Value Date     06/09/2022     12/08/2017    POTASSIUM 4.5 06/09/2022    POTASSIUM 4.2 12/08/2017    CHLORIDE 104 06/09/2022    CHLORIDE 104 12/08/2017    CO2 29 06/09/2022    CO2 30 12/08/2017    BUN 19 06/09/2022    BUN 17 12/08/2017    CR 1.08 06/09/2022    CR 0.89 12/08/2017     (H) 06/09/2022    GLC 86 12/08/2017     COAGS: No results found for: PTT, INR, FIBR  POC: No results found for: BGM, HCG, HCGS  HEPATIC:   Lab Results   Component Value Date    ALBUMIN 3.3 (L) 06/09/2022    PROTTOTAL 6.9 06/09/2022    ALT 18 06/09/2022    AST 15 06/09/2022    ALKPHOS 65 06/09/2022    BILITOTAL 2.3 (H) 06/09/2022     OTHER:   Lab Results   Component Value Date    A1C 5.0 12/08/2017    MAIK 9.3 06/09/2022    LIPASE 214 06/09/2022    AMYLASE 38 06/09/2022    TSH 2.19 03/13/2014       Anesthesia Plan    ASA Status:  3   NPO Status:  NPO Appropriate    Anesthesia Type: General.     - Airway: ETT   Induction: Intravenous.   Maintenance: Balanced.        Consents    Anesthesia Plan(s) and associated risks, benefits, and realistic alternatives discussed. Questions answered and patient/representative(s) expressed understanding.    - Discussed:     - Discussed with:  Patient         Postoperative Care    Pain management: IV analgesics, Oral pain medications, Multi-modal analgesia.   PONV prophylaxis: Ondansetron (or other 5HT-3), Dexamethasone or Solumedrol     Comments:                Nino Mao MD

## 2022-06-10 NOTE — ANESTHESIA CARE TRANSFER NOTE
Patient: Yannick Urena    Procedure: Procedure(s):  Laparoscopic cholecystectomy       Diagnosis: Cholecystitis [K81.9]  Diagnosis Additional Information: No value filed.    Anesthesia Type:   General     Note:    Oropharynx: oropharynx clear of all foreign objects and spontaneously breathing  Level of Consciousness: awake  Oxygen Supplementation: face mask  Level of Supplemental Oxygen (L/min / FiO2): 8  Independent Airway: airway patency satisfactory and stable  Dentition: dentition unchanged  Vital Signs Stable: post-procedure vital signs reviewed and stable  Report to RN Given: handoff report given  Patient transferred to: PACU  Comments: Pt to PACU awake and exchanging well.  SPO2 100%.  Report to RN.  Handoff Report: Identifed the Patient, Identified the Reponsible Provider, Reviewed the pertinent medical history, Discussed the surgical course, Reviewed Intra-OP anesthesia mangement and issues during anesthesia, Set expectations for post-procedure period and Allowed opportunity for questions and acknowledgement of understanding      Vitals:  Vitals Value Taken Time   BP     Temp     Pulse 74 06/10/22 0944   Resp 7 06/10/22 0946   SpO2 99 % 06/10/22 0946   Vitals shown include unvalidated device data.    Electronically Signed By: MONI Schuster CRNA  Joanne 10, 2022  9:47 AM

## 2022-06-10 NOTE — ED PROVIDER NOTES
[facility] ED Provider Note  [facility]  8:39 PM  6/9/2022    Yannick Urena  67 year oldmale    Chief Complaint   Patient presents with     Cholecystitis       HPI:  59-year-old gentleman with 2 days of abdominal discomfort nausea 1 episode of vomiting and decreased appetite.  Pain is crampy localized to the right upper quadrant.  No previous abdominal surgery and no associated fever.  No dysuria melena or hematochezia.  Seen at urgent care where he had a CT scan concerning for cholecystitis.  Had a mild elevation of bilirubin at 2.4.  Transaminases were normal      ROS: 10 point ROS is negative other than mentioned above in HPI    Past Medical History:   Diagnosis Date     Benign essential hypertension      Elevated coronary artery calcium score     total Agatston 2012-286 / 82nd percentile     Hyperlipidemia LDL goal <130      Past Surgical History:   Procedure Laterality Date     EXTRACTION(S) DENTAL  2013      REPAIR OF NASAL SEPTUM  1978     TONSILLECTOMY  1960's     wisdom teeth removal  age 14         SH: here w significant other       [COMPLETED] CT SCAN FLUSH  [COMPLETED] iopamidol (ISOVUE-370) solution 500 mL    amLODIPine (NORVASC) 5 MG tablet, Take 1 tablet (5 mg) by mouth 2 times daily  Ascorbic Acid (VITAMIN C PO), Take 1 tablet by mouth daily  rosuvastatin (CRESTOR) 20 MG tablet, Take 1 tablet (20 mg) by mouth daily (Patient taking differently: Take 20 mg by mouth At Bedtime)               Allergies   Allergen Reactions     Atorvastatin Muscle Pain (Myalgia)     Penicillins      Unknown reaction     Pollen Extract      Seasonal Allergies          Physical Exam  /87   Pulse 71   Temp 98.1  F (36.7  C) (Oral)   Resp 20   SpO2 98%     HEENT:  mmm  Neck: supple  CV: ppi, regular   Resp: speaking in full sentences with any resp distress  Abd:  soft, + ttp RUQ, + murphys , nd   Ext:   Skin: warm dry well perfused  Neuro: Alert, no gross motor or sensory deficits,  gait  stable      Labs and Imaging:    Labs Ordered and Resulted from Time of ED Arrival to Time of ED Departure   HEPATIC FUNCTION PANEL - Abnormal       Result Value    Bilirubin Total 2.3 (*)     Bilirubin Direct 0.3 (*)     Protein Total 6.9      Albumin 3.3 (*)     Alkaline Phosphatase 65      AST 15      ALT 18     LIPASE - Normal    Lipase 214     COVID-19 VIRUS (CORONAVIRUS) BY PCR - Normal    SARS CoV2 PCR Negative           US Abdomen Limited   Final Result   IMPRESSION:   1.  Gallstones with gallbladder wall thickening and pericholecystic fluid. Negative sonographic Bashir sign, correlate for acute cholecystitis.                  Medications Administered in ED:  Medications   acetaminophen (TYLENOL) tablet 1,000 mg (has no administration in time range)   ibuprofen (ADVIL/MOTRIN) tablet 600 mg (has no administration in time range)   HYDROmorphone (PF) (DILAUDID) injection 0.3-0.5 mg (has no administration in time range)   ondansetron (ZOFRAN) injection 4 mg (has no administration in time range)   dextrose 5% in lactated ringers infusion (1,000 mLs Intravenous New Bag 22)   cefTRIAXone (ROCEPHIN) 2 g vial to attach to  ml bag for ADULTS or NS 50 ml bag for PEDS (2 g Intravenous New Bag 22)           Medical Decision Makin-year-old male here with presentation consistent with cholecystitis.  Low suspicion for choledocholithiasis.  Will be admitted to the hospital service for symptom control until his surgery able to perform cholecystectomy.  Patient comfortable agreeable with that plan.      Diagnosis:    ICD-10-CM    1. Acute cholecystitis  K81.0        Disposition:  Observation       Suresh Hazel MD  [facility]  [facility]                       Suresh Hazel MD  06/10/22 2002

## 2022-06-10 NOTE — DISCHARGE SUMMARY
[facility]  Hospitalist Discharge Summary      Date of Admission:  6/9/2022  Date of Discharge:  6/10/2022 12:27 PM  Discharging Provider: Kannan Brooks MD, MD  Discharge Service: Hospitalist Service    Discharge Diagnoses   Acute cholecystitis  Status post laparoscopic cholecystectomy    Follow-ups Needed After Discharge       Unresulted Labs Ordered in the Past 30 Days of this Admission     Date and Time Order Name Status Description    6/10/2022  9:23 AM Surgical Pathology Exam In process       These results will be followed up by general surgery and PCP    Discharge Disposition   Discharged to home  Condition at discharge: Stable      Hospital Course      I assume medicine service today.  I was not able to examine and see this gentleman as he was in the OR this morning and was subsequently discharged from PACU as reportedly he tolerated the laparoscopic cholecystectomy.  Discharge instructions, prescriptions was deferred to general surgery service.    I will refer to excerpts of my colleagues prior progress notes as listed below for other details of his hospital stay      Yannick Urena is a 67 year old male with PMHx significant for HTN, HLD, who was admitted on 6/9/2022 with acute cholecystitis.    Acute Cholecystitis   Pt presented with 4 days progressive RUQ abdominal pain, n/v, low PO intake. Afebrile. Labs with bilirubin mildly elevated, alk phos and transaminases within range. Slight leukocytosis. Imaging demonstrates gallstones with gallbladder wall thickening common duct measuring 3.8 mm.  Given a dose of Rocephin in the ED and admitted to observation.  Is medically optimized for surgery with RCRI of 0.  -Consult general surgery for cholecystectomy  -N.p.o. after midnight  -IV fluids  -Repeat CMP, CBC with a.m. labs  -antiemetics, analgesia PRN    HTN - hold amlodipine.   HLD - hold statin, obs status. Resume @ discharge.       Awaiting formal pharmacy reconciliation to  resume home medications.     DVT Prophylaxis: Ambulate every shift  Code Status: Full Code as discussed with the patient on admission.      Consultations This Hospital Stay   SURGERY GENERAL IP CONSULT    Code Status   Full Code    Time Spent on this Encounter   I, Kannan Brooks MD, MD, discharged this patient today but I did not personally see the patient today and will not be billing for the patient's discharge.       Kannan Brooks MD, MD  [facility] PREOP/POSTOP  [address] [facility] [address]  Phone: [phone]  Fax: [phone]  ______________________________________________________________________    Physical Exam   Vital Signs: Temp: 97.1  F (36.2  C) Temp src: Temporal BP: 134/70 Pulse: 73   Resp: 15 SpO2: 92 % O2 Device: None (Room air) Oxygen Delivery: 7 LPM  Weight: 170 lbs 0 oz  I was not able to see the patient as he was discharge after surgery from PACU       Primary Care Physician   Physician No Ref-Primary    Discharge Orders      When to call - Contact Surgeon Team    You may experience symptoms that require follow-up before your scheduled appointment. Contact your Surgeon Team if you are concerned about pain control, large amount of bleeding, constipation, or if you experience signs of infection (fever, growing tenderness at the surgery site, a large amount of drainage, severe pain, foul-smelling drainage, redness or swelling).     No driving or operating machinery    Do NOT drive any vehicle or operate mechanical equipment for 24 hours following the end of your surgery.  Even though you may feel normal, your reactions may be affected by Anesthesia medication you received.     No Alcohol    Do NOT drink alcoholic beverages for 24 hours following your surgery and while taking pain medications.     Diet Instructions    You may drink clear liquids (apple juice, ginger ale, 7-up, broth, etc.) and progress to your regular diet as you feel able. It is  important to stay well-hydrated after surgery and drink plenty of water.    If you develop loose stools following surgery, follow a low fat diet for 1-2 months, then begin to slowly re-introduce fats to your diet.     Shower/Bathing - Restrictions (specify)    Shower/Bathing - Starting after 24 hours, you may shower. Let water run over incisions and pat dry. Do NOT soak incision in tub or go swimming in a lake/pool for 2 weeks postoperatively.     Incision Care    Keep dressing clean and dry.  Wash your hands with soap and warm water before you touch the site of surgery. You have sterile surgical tape called Steri-strips on your surgical sites.  Leave them in place until they fall off on their own (typically 7-14 days). You have stitches underneath the skin which will dissolve over several weeks. Do not apply any creams or lotions to skin until steri-strips are off and incisions are completely healed.     Ice to affected area    Ice to operative site PRN     Discharge Instructions - Comfort and Pain Management    Pain after surgery is normal and expected. You will have some amount of pain after surgery. Your pain will improve with time. There are several things you can do to help reduce your pain including: rest, ice, and using pain medications as needed. Use pain interventions and don't wait until pain level is out of control. Take over the counter pain medications such as acetaminophen (Tylenol) and ibuprofen (Motrin/Advil) in doses recommended by your surgeon.    After laparoscopic surgery, some patients experience shoulder pain. This is referred pain from stretch of the abdominal wall and diaphragm during surgery. Typically this pain resolves after about 48 hours.     Contact your Surgeon Team if you have pain that persists or worsens after surgery despite rest, ice, and taking your medications as prescribed. You may have a dry mouth, a sore throat, muscles aches or trouble sleeping, and these symptoms should go  away after 24 hours.     Discharge Instructions - Rest    Rest and relax for the next 24 hours. Make arrangements to have someone stay with you overnight, and avoid hazardous and strenuous activities.  Do NOT make any important decisions for the next 24 hours.     Discharge Instructions - Follow-up Appointment (Weeks)    FOLLOW-UP AFTER SURGERY:  - Our office will contact you approximately 2-3 weeks after surgery to check on your progress and answer any questions you may have.  If you are doing well, you will not need to return for an office appointment.  If any concerns are identified over the phone, we will help you make an appointment to see a provider.      - If you have not received a phone call, have any questions or concerns, or would like to be seen, please call us at [phone].  We are located at: [address], [address]; [place] [address]     No lifting    No lifting over 20 pounds and no strenuous physical activity for 3 weeks.       Significant Results and Procedures   Most Recent 3 CBC's:Recent Labs   Lab Test 06/10/22  0809 06/09/22  1114 12/14/15  1118   WBC 8.0 11.3* 5.6   HGB 14.8 15.8 16.0   MCV 86 86 84    329 319     Most Recent 3 BMP's:Recent Labs   Lab Test 06/10/22  0809 06/09/22  1114 12/08/17  1029    138 141   POTASSIUM 3.7 4.5 4.2   CHLORIDE 106 104 104   CO2 27 29 30   BUN 21 19 17   CR 0.92 1.08 0.89   ANIONGAP 5 5 7   MAIK 8.9 9.3 9.2   GLC 88 100* 86     Most Recent 2 LFT's:Recent Labs   Lab Test 06/10/22  0809 06/09/22  1814   AST 15 15   ALT 16 18   ALKPHOS 60 65   BILITOTAL 2.2* 2.3*     Most Recent 3 INR's:No lab results found.,   Results for orders placed or performed during the hospital encounter of 06/09/22   US Abdomen Limited    Narrative    EXAM: US ABDOMEN LIMITED  LOCATION: [facility]  DATE/TIME: 6/9/2022 5:47 PM    INDICATION: RUQ pain, vomiting  COMPARISON: None.  TECHNIQUE: Limited abdominal  ultrasound.    FINDINGS:    GALLBLADDER: Gallstones. Gallbladder wall thickening. Trace pericholecystic fluid. Negative sonographic Bashir sign.    BILE DUCTS: No biliary dilatation. The common duct measures 3.8 mm.    LIVER: Normal parenchyma with smooth contour. No focal mass.    RIGHT KIDNEY: No hydronephrosis. 1.4 x 1.5 x 1.5 cm simple right cysts, no follow-up required.    PANCREAS: The visualized portions are normal.    No ascites.      Impression    IMPRESSION:  1.  Gallstones with gallbladder wall thickening and pericholecystic fluid. Negative sonographic Bashir sign, correlate for acute cholecystitis.             Discharge Medications   Discharge Medication List as of 6/10/2022 11:14 AM      START taking these medications    Details   acetaminophen (TYLENOL) 500 MG tablet Take 2 tablets (1,000 mg) by mouth every 6 hours as needed for pain, OTC      ibuprofen (ADVIL/MOTRIN) 200 MG tablet Take 3 tablets (600 mg) by mouth every 6 hours as needed for moderate pain, OTC      oxyCODONE (ROXICODONE) 5 MG tablet Take 1-2 tablets (5-10 mg) by mouth every 4 hours as needed for moderate to severe pain, Disp-12 tablet, R-0, E-Prescribe         CONTINUE these medications which have NOT CHANGED    Details   amLODIPine (NORVASC) 5 MG tablet Take 1 tablet (5 mg) by mouth 2 times daily, Disp-180 tablet, R-3, E-Prescribe      Ascorbic Acid (VITAMIN C PO) Take 1 tablet by mouth daily, Historical      rosuvastatin (CRESTOR) 20 MG tablet Take 1 tablet (20 mg) by mouth daily, Disp-90 tablet, R-3, E-Prescribe           Allergies   Allergies   Allergen Reactions     Atorvastatin Muscle Pain (Myalgia)     Penicillins      Unknown reaction     Pollen Extract      Seasonal Allergies

## 2022-06-10 NOTE — PHARMACY-ADMISSION MEDICATION HISTORY
Admission medication history interview status for this patient is complete. See [facility] admission navigator for allergy information, prior to admission medications and immunization status.     Medication history interview done, indicate source(s): Patient  Medication history resources (including written lists, pill bottles, clinic record):EPIC    Changes made to PTA medication list:  Added: Vit C  Changed: none  Reported as Not Taking: Multivitamins, ASA  Removed: Multivitamins, ASA    Medication reconciliation/reorder completed by provider prior to medication history?  N     Prior to Admission medications    Medication Sig Last Dose Taking? Auth Provider   amLODIPine (NORVASC) 5 MG tablet Take 1 tablet (5 mg) by mouth 2 times daily 6/9/2022 at am Yes Oscar Villatoro MD   Ascorbic Acid (VITAMIN C PO) Take 1 tablet by mouth daily  Yes Unknown, Entered By History   rosuvastatin (CRESTOR) 20 MG tablet Take 1 tablet (20 mg) by mouth daily  Patient taking differently: Take 20 mg by mouth At Bedtime 6/8/2022 at hs Yes Oscar Villatoro MD

## 2022-06-11 NOTE — OP NOTE
General Surgery Operative Note    PREOPERATIVE DIAGNOSIS:  Cholecystitis [K81.9]    POSTOPERATIVE DIAGNOSIS:  Same    PROCEDURE:  Laparoscopic Cholecystectomy    ANESTHESIA:  General    PREOPERATIVE MEDICATIONS:  Ancef IV.    SURGEON:  Amisha Fuentes MD    ASSISTANT:  Alfredito Thomas PA-C    ESTIMATED BLOOD LOSS:  20 ml    INDICATIONS:  Yannick Urena is a 67 year old male who has been experiencing RUQ abdominal pain for the past 4 days associated with nausea.  Abdominal imaging has revealed gallstones and gallbladder wall thickening.  He now presents for laparoscopic cholecystectomy after having risks and benefits reviewed in detail.    PROCEDURE:   An infraumbilical incision was made and the abdomen was entered using a Bella trocar technique.  Secondary trocars were placed under laparoscopic guidance.  We proceeded in the usual fashion first finding the gallbladder neck cystic duct junction. Omentum was adherent to the gallbladder and was carefully peeled down atraumatic graspers. The gallbladder was too taut to grasp, so a needle aspirator was used to withdraw ~ 35 ml of dark green bile.   The cystic duct was then identified and cleared of inflammatory adhesions. The cystic artery was similarly identified.  It was noted to have a small posterior branch.  Once a critical window of safety was achieved, the cystic duct was triply clipped and divided.  Both branches of the cystic artery were also triply clipped and divided. The gallbladder was removed from the gallbladder bed using cautery.  Once free, the gallbladder was extracted from the infraumbilical site in an EndoCatch bag.  The liver bed was inspected for hemostasis, which was ensured.  Trocar sites were then infiltrated with 0.5% Marcaine plain. The infraumbilical fascial opening was closed with interrupted 0 Vicryl. The skin was closed using 4-0 subcuticular vicryl. Steri-Strips were placed on the incisions. The patient was transferred to recovery in  good condition.        INTRAOPERATIVE FINDINGS: Severely inflamed gallbladder    Specimens:   ID Type Source Tests Collected by Time Destination   1 : gallbladder Tissue Gallbladder SURGICAL PATHOLOGY EXAM Amisha Fuentes MD 6/10/2022  9:23 AM        Amisha Fuentes MD

## 2022-06-11 NOTE — CONSULTS
General Surgery Consultation    Yannick Urena MRN# [id number]   Age: 67 year old YOB: 1955     Date of Admission:  6/9/2022    Reason for consult:            Abdominal pain, right upper quadrant       Requesting physician:            Lizette Powers PA-C                Assessment and Plan:   Assessment:   Yannick Urena is a 67 year old male with acute cholecystitis.     Comorbidities:   has a past medical history of Benign essential hypertension, Elevated coronary artery calcium score, and Hyperlipidemia LDL goal <130.      Plan:   I have offered the patient a laparoscopic cholecystectomy.     We have discussed the indication, alternatives, risks and expected recovery.  Specifically we have discussed incisions, scarring, postoperative infections, anesthesia, bleeding, blood transfusion, open conversion, common bile duct injury, injury to intra-abdominal organs, adhesions leading to bowel obstruction, retained common bile duct stone, bile leak, DVT, PE, hernia, post cholecystectomy diarrhea, recovery, postoperative dietary restrictions and physical limitations.  We have discussed the recommended interventions and treatments for these complications.  All questions have been answered to the best of my ability.    He elects to proceed with surgery.                  Chief Complaint:   Abdominal pain, right upper quadrant     History is obtained from the patient.         History of Present Illness:   Yannick Urena is a 67 year old male who presents with right upper quadrant abdominal pain for the past 4 days.  The pain is constant.  He has not had similar pain in the past.  There is not an association with eating any type of food, but he has lost his appetite.  Positive for associated symptoms of nausea and bloating.  He  does not have a history of jaundice or dark urine.  He  has not had pancreatitis in the past.              Past Medical History:    has a past medical history of Benign essential  "hypertension, Elevated coronary artery calcium score, and Hyperlipidemia LDL goal <130.          Past Surgical History:     Past Surgical History:   Procedure Laterality Date     EXTRACTION(S) DENTAL  2013      REPAIR OF NASAL SEPTUM  1978     TONSILLECTOMY  1960's     wisdom teeth removal  age 14             Social History:     Social History     Tobacco Use     Smoking status: Never Smoker     Smokeless tobacco: Never Used   Substance Use Topics     Alcohol use: Yes     Comment: once a month             Family History:   Family history reviewed and is not pertinent.         Allergies:     Allergies   Allergen Reactions     Atorvastatin Muscle Pain (Myalgia)     Penicillins      Unknown reaction     Pollen Extract      Seasonal Allergies              Medications:   No current facility-administered medications on file prior to encounter.  amLODIPine (NORVASC) 5 MG tablet, Take 1 tablet (5 mg) by mouth 2 times daily  Ascorbic Acid (VITAMIN C PO), Take 1 tablet by mouth daily  rosuvastatin (CRESTOR) 20 MG tablet, Take 1 tablet (20 mg) by mouth daily (Patient taking differently: Take 20 mg by mouth At Bedtime)               Review of Systems:   The 10 point review of systems is negative other than noted in the HPI.          Physical Exam:   /70   Pulse 73   Temp 97.1  F (36.2  C) (Temporal)   Resp 15   Ht 1.727 m (5' 8\")   Wt 77.1 kg (170 lb)   SpO2 92%   BMI 25.85 kg/m    General - Well developed, well nourished male in no apparent distress  HEENT:  Head normocephalic and atraumatic, pupils equal and round, conjunctivae clear, no scleral icterus, mucous membranes moist, external ears and nose normal  Neck: Supple without thyromegaly or masses  Lymphatic: No cervical, or supraclavicular lymphadenopathy  Lungs: Clear to auscultation bilaterally  Heart: regular rate and rhythm, no murmurs  Abdomen:   soft, flat, non-distended with mild tenderness noted in the right upper quadrant, Gallbladder is palpable " with deep palpation  Extremities: Warm without edema  Neurologic: nonfocal  Psychiatric: Mood and affect appropriate  Skin: Without lesions, rashes, or juandice         Data:     WBC -   Lab Results   Component Value Date    WBC 8.0 06/10/2022       HgB -   Lab Results   Component Value Date    HGB 14.8 06/10/2022       Plt-   Lab Results   Component Value Date     06/10/2022       Liver Function Studies -   Recent Labs   Lab Test 06/10/22  0809   PROTTOTAL 6.7*   ALBUMIN 3.1*   BILITOTAL 2.2*   ALKPHOS 60   AST 15   ALT 16       Lipase-   Lab Results   Component Value Date    LIPASE 214 06/09/2022         Imaging:  All imaging studies reviewed by me.    Results for orders placed or performed during the hospital encounter of 06/09/22   US Abdomen Limited    Narrative    EXAM: US ABDOMEN LIMITED  LOCATION: [facility]  DATE/TIME: 6/9/2022 5:47 PM    INDICATION: RUQ pain, vomiting  COMPARISON: None.  TECHNIQUE: Limited abdominal ultrasound.    FINDINGS:    GALLBLADDER: Gallstones. Gallbladder wall thickening. Trace pericholecystic fluid. Negative sonographic Bashir sign.    BILE DUCTS: No biliary dilatation. The common duct measures 3.8 mm.    LIVER: Normal parenchyma with smooth contour. No focal mass.    RIGHT KIDNEY: No hydronephrosis. 1.4 x 1.5 x 1.5 cm simple right cysts, no follow-up required.    PANCREAS: The visualized portions are normal.    No ascites.      Impression    IMPRESSION:  1.  Gallstones with gallbladder wall thickening and pericholecystic fluid. Negative sonographic Bashir sign, correlate for acute cholecystitis.           This note was created using voice recognition software. Undetected word substitutions or other errors may have occurred.     Time spent with the patient, reviewing the EMR, reviewing laboratory and imaging studies, more than 50% of which was counseling and coordinating care:  35 minutes.     Amisha Fuentes MD

## 2022-06-13 LAB
PATH REPORT.COMMENTS IMP SPEC: NORMAL
PATH REPORT.COMMENTS IMP SPEC: NORMAL
PATH REPORT.FINAL DX SPEC: NORMAL
PATH REPORT.GROSS SPEC: NORMAL
PATH REPORT.MICROSCOPIC SPEC OTHER STN: NORMAL
PATH REPORT.RELEVANT HX SPEC: NORMAL
PHOTO IMAGE: NORMAL

## 2022-06-13 PROCEDURE — 88304 TISSUE EXAM BY PATHOLOGIST: CPT | Mod: 26 | Performed by: PATHOLOGY

## 2022-06-21 DIAGNOSIS — I10 BENIGN ESSENTIAL HYPERTENSION: ICD-10-CM

## 2022-06-21 DIAGNOSIS — I25.10 CORONARY ARTERY DISEASE INVOLVING NATIVE CORONARY ARTERY OF NATIVE HEART WITHOUT ANGINA PECTORIS: ICD-10-CM

## 2022-06-21 RX ORDER — AMLODIPINE BESYLATE 5 MG/1
5 TABLET ORAL 2 TIMES DAILY
Qty: 180 TABLET | Refills: 0 | Status: SHIPPED | OUTPATIENT
Start: 2022-06-21 | End: 2022-07-25

## 2022-06-24 ENCOUNTER — TELEPHONE (OUTPATIENT)
Dept: SURGERY | Facility: CLINIC | Age: 67
End: 2022-06-24

## 2022-06-24 NOTE — TELEPHONE ENCOUNTER
[facility] Postoperative Call Note:     Yannick Urena was called for an update regarding his recovery. He underwent a laparoscopic cholecystectomy by Dr. Fuentes on 6/10/22. Today he tells me he is doing well and denies any complaints. He is eating a normal diet and his bowels are regular. He states his wounds are healing well.    The pathology revealed acute on chronic cholecystitis and cholelithiasis. This was discussed with the patient.     Patient was instructed to slowly and gradually resume all normal activities.The patient states all of his questions were answered. He understands our discussion. He agrees to follow up as needed or to call our office with any concerns.    Alfredito Thomas PA-C      Please route or send letter to:  Primary Care Provider (PCP)

## 2022-07-25 DIAGNOSIS — E78.00 HYPERCHOLESTEROLEMIA: ICD-10-CM

## 2022-07-25 DIAGNOSIS — I25.10 CORONARY ARTERY DISEASE INVOLVING NATIVE CORONARY ARTERY OF NATIVE HEART WITHOUT ANGINA PECTORIS: ICD-10-CM

## 2022-07-25 DIAGNOSIS — I10 BENIGN ESSENTIAL HYPERTENSION: ICD-10-CM

## 2022-07-25 RX ORDER — ROSUVASTATIN CALCIUM 20 MG/1
20 TABLET, COATED ORAL AT BEDTIME
Qty: 90 TABLET | Refills: 1 | Status: SHIPPED | OUTPATIENT
Start: 2022-07-25 | End: 2023-05-01

## 2022-07-25 RX ORDER — AMLODIPINE BESYLATE 5 MG/1
5 TABLET ORAL 2 TIMES DAILY
Qty: 180 TABLET | Refills: 1 | Status: SHIPPED | OUTPATIENT
Start: 2022-07-25 | End: 2023-05-01

## 2022-07-25 NOTE — TELEPHONE ENCOUNTER
Received refill request for:  Amlodipine, Rosuvastatin  Last OV was: 3/17/21 Dr. Villatoro  Labs/EKG: 3/15/21 Lipids  F/U scheduled:   Date Time Provider Department Center   12/23/2022 10:15 AM Oscar Villatoro MD [facility]     Pharmacy: [facility] Refill Guideline reviewed.  Medication meets criteria for refill.    Francia Broussard RN, BSN  07/25/22 at 11:01 AM

## 2022-12-05 DIAGNOSIS — E78.5 HYPERLIPIDEMIA LDL GOAL <130: Primary | ICD-10-CM

## 2022-12-05 DIAGNOSIS — E78.00 HYPERCHOLESTEROLEMIA: ICD-10-CM

## 2022-12-05 DIAGNOSIS — I25.10 CORONARY ARTERY DISEASE INVOLVING NATIVE CORONARY ARTERY OF NATIVE HEART WITHOUT ANGINA PECTORIS: ICD-10-CM

## 2022-12-19 ENCOUNTER — LAB (OUTPATIENT)
Dept: LAB | Facility: CLINIC | Age: 67
End: 2022-12-19
Payer: COMMERCIAL

## 2022-12-19 DIAGNOSIS — I25.10 CORONARY ARTERY DISEASE INVOLVING NATIVE CORONARY ARTERY OF NATIVE HEART WITHOUT ANGINA PECTORIS: ICD-10-CM

## 2022-12-19 DIAGNOSIS — E78.00 HYPERCHOLESTEROLEMIA: ICD-10-CM

## 2022-12-19 LAB
ALT SERPL W P-5'-P-CCNC: 32 U/L (ref 0–70)
CHOLEST SERPL-MCNC: 113 MG/DL
FASTING STATUS PATIENT QL REPORTED: YES
HDLC SERPL-MCNC: 44 MG/DL
LDLC SERPL CALC-MCNC: 36 MG/DL
NONHDLC SERPL-MCNC: 69 MG/DL
TRIGL SERPL-MCNC: 167 MG/DL

## 2022-12-19 PROCEDURE — 84460 ALANINE AMINO (ALT) (SGPT): CPT | Performed by: INTERNAL MEDICINE

## 2022-12-19 PROCEDURE — 36415 COLL VENOUS BLD VENIPUNCTURE: CPT | Performed by: INTERNAL MEDICINE

## 2022-12-19 PROCEDURE — 80061 LIPID PANEL: CPT | Performed by: INTERNAL MEDICINE

## 2023-04-30 NOTE — PROGRESS NOTES
HPI and Plan:       I had the pleasure of seeing Mr. Urena in followup at the [facility] today. He is a very pleasant 67-year-old gentleman who I last saw in 3/2021..  He has a history of coronary artery disease based on a moderately elevated coronary artery calcium score in 2012.  At that time he was found to have a calcium score of 286.3 with a left main score of 37.9, LAD of 34.9, circumflex 64.5 and RCA of 159.  He also has a significant family history of premature coronary artery disease, hypertension and dyslipidemia.      He underwent an exercise stress perfusion study on 01/11/2018.  He exercised for 15 minutes according to the Darren protocol.  The stress EKG and nuclear perfusion study were negative for ischemia.      He presents today feeling well overall from a cardiovascular standpoint.  He does, however, bring up occasional chest discomfort that he is experienced over the winter.  It is not clearly related to exertion, although he did have one episode while hunting that resolved spontaneously.  He denies any associated dyspnea, syncope or presyncope.    He established care with a family medicine physician last fall and lisinopril was added appropriately for better blood pressure control.  Unfortunately the patient discontinued his amlodipine at the time lisinopril was started.  His blood pressures at home are generally better than what we have obtained here, although they are still running between 130-140 mmHg.    PHYSICAL EXAMINATION:  Dictated below.      He underwent a fasting lipid panel on 12/19/2022 which demonstrate total cholesterol 113, HDL 44, LDL 36 and triglycerides 167.     IMPRESSION:   1.  Coronary artery disease based on an abnormal calcium score in 2012.    2.  Dyslipidemia.  Lipids are at goal on rosuvastatin 20 mg daily.  3.  Hypertension.  Not optimally controlled on lisinopril 20 mg daily.    Mr. Urena is doing well overall from a cardiovascular  standpoint, but I do think an ischemic reevaluation is indicated given his recent onset chest discomfort.  To that effect I have ordered an exercise stress perfusion study.  An echocardiogram will also be ordered for baseline structural/valvular assessment.    I have asked him to restart his amlodipine 5 mg twice daily in addition to his lisinopril.  Finally, I have asked him to restart aspirin 81 mg on a daily basis given his moderate coronary artery calcification.  He was previously on aspirin 325 mg daily but discontinued it at some point over the past 2 years.    It was a pleasure seeing him in follow-up today.                        CURRENT MEDICATIONS:  Current Outpatient Medications   Medication Sig Dispense Refill     acetaminophen (TYLENOL) 500 MG tablet Take 2 tablets (1,000 mg) by mouth every 6 hours as needed for pain       amLODIPine (NORVASC) 5 MG tablet Take 1 tablet (5 mg) by mouth 2 times daily 180 tablet 1     Ascorbic Acid (VITAMIN C PO) Take 1 tablet by mouth daily       ibuprofen (ADVIL/MOTRIN) 200 MG tablet Take 3 tablets (600 mg) by mouth every 6 hours as needed for moderate pain       oxyCODONE (ROXICODONE) 5 MG tablet Take 1-2 tablets (5-10 mg) by mouth every 4 hours as needed for moderate to severe pain 12 tablet 0     rosuvastatin (CRESTOR) 20 MG tablet Take 1 tablet (20 mg) by mouth At Bedtime 90 tablet 1       ALLERGIES     Allergies   Allergen Reactions     Atorvastatin Muscle Pain (Myalgia)     Penicillins      Unknown reaction     Pollen Extract      Seasonal Allergies        PAST MEDICAL HISTORY:  Past Medical History:   Diagnosis Date     Benign essential hypertension      Elevated coronary artery calcium score     total Agatston 2012-286 / 82nd percentile     Hyperlipidemia LDL goal <130        PAST SURGICAL HISTORY:  Past Surgical History:   Procedure Laterality Date     EXTRACTION(S) DENTAL  2013      REPAIR OF NASAL SEPTUM  1978     LAPAROSCOPIC CHOLECYSTECTOMY N/A 6/10/2022     Procedure: Laparoscopic cholecystectomy;  Surgeon: Amisha Fuentes MD;  Location: [facility] OR     TONSILLECTOMY  1960's     wisdom teeth removal  age 14       FAMILY HISTORY:  Family History   Problem Relation Age of Onset     Heart Disease Father      Diabetes No family hx of        SOCIAL HISTORY:  Social History     Socioeconomic History     Marital status:    Occupational History     Employer: [employer]     Comment: service, [employer]   Tobacco Use     Smoking status: Never     Smokeless tobacco: Never   Vaping Use     Vaping status: Never Used   Substance and Sexual Activity     Alcohol use: Yes     Comment: once a month     Drug use: No     Sexual activity: Yes     Partners: Female     Comment: wife menopause   Other Topics Concern     Parent/sibling w/ CABG, MI or angioplasty before 65F 55M? Yes     Bike Helmet Yes     Seat Belt Yes       Review of Systems:  Skin:          Eyes:         ENT:         Respiratory:          Cardiovascular:         Gastroenterology:        Genitourinary:         Musculoskeletal:         Neurologic:         Psychiatric:         Heme/Lymph/Imm:         Endocrine:           Physical Exam:  Vitals: There were no vitals taken for this visit.    Constitutional:  cooperative        Skin:  warm and dry to the touch          Head:  normocephalic        Eyes:  pupils equal and round        Lymph:      ENT:           Neck:           Respiratory:  clear to auscultation         Cardiac: regular rhythm                                                         GI:  abdomen soft        Extremities and Muscular Skeletal:                 Neurological:  no gross motor deficits        Psych:  Alert and Oriented x 3        CC  Referred Self, MD  No address on file

## 2023-05-01 ENCOUNTER — OFFICE VISIT (OUTPATIENT)
Dept: CARDIOLOGY | Facility: CLINIC | Age: 68
End: 2023-05-01
Payer: COMMERCIAL

## 2023-05-01 VITALS
HEIGHT: 68 IN | SYSTOLIC BLOOD PRESSURE: 160 MMHG | OXYGEN SATURATION: 95 % | HEART RATE: 61 BPM | DIASTOLIC BLOOD PRESSURE: 84 MMHG | WEIGHT: 178 LBS | BODY MASS INDEX: 26.98 KG/M2

## 2023-05-01 DIAGNOSIS — R93.1 ELEVATED CORONARY ARTERY CALCIUM SCORE: Primary | ICD-10-CM

## 2023-05-01 DIAGNOSIS — I10 BENIGN ESSENTIAL HYPERTENSION: ICD-10-CM

## 2023-05-01 DIAGNOSIS — E78.00 HYPERCHOLESTEROLEMIA: ICD-10-CM

## 2023-05-01 DIAGNOSIS — R06.00 DYSPNEA, UNSPECIFIED TYPE: ICD-10-CM

## 2023-05-01 DIAGNOSIS — I25.10 CORONARY ARTERY DISEASE INVOLVING NATIVE CORONARY ARTERY OF NATIVE HEART WITHOUT ANGINA PECTORIS: ICD-10-CM

## 2023-05-01 DIAGNOSIS — E78.5 HYPERLIPIDEMIA LDL GOAL <130: ICD-10-CM

## 2023-05-01 PROCEDURE — 99214 OFFICE O/P EST MOD 30 MIN: CPT | Performed by: INTERNAL MEDICINE

## 2023-05-01 RX ORDER — ROSUVASTATIN CALCIUM 20 MG/1
20 TABLET, COATED ORAL AT BEDTIME
Qty: 90 TABLET | Refills: 3 | Status: SHIPPED | OUTPATIENT
Start: 2023-05-01

## 2023-05-01 RX ORDER — CALCIUM CARBONATE 500 MG/1
1 TABLET, CHEWABLE ORAL 2 TIMES DAILY
COMMUNITY

## 2023-05-01 RX ORDER — AMLODIPINE BESYLATE 5 MG/1
5 TABLET ORAL 2 TIMES DAILY
Qty: 180 TABLET | Refills: 3 | Status: SHIPPED | OUTPATIENT
Start: 2023-05-01 | End: 2024-07-24

## 2023-05-01 NOTE — PATIENT INSTRUCTIONS
1) Start taking amlodipine 5 MG twice daily and lisinopril 20 MG daily.    2) Start taking aspirin 81 MG daily.

## 2023-05-01 NOTE — LETTER
5/1/2023    SOFIA CUADRA MD  [facility] [address]  [facility] [address]    RE: Yannick Antonioon       Dear Colleague,     I had the pleasure of seeing Yannick Urena in the [facility].  HPI and Plan:       I had the pleasure of seeing Mr. Urena in followup at the [facility] today. He is a very pleasant 67-year-old gentleman who I last saw in 3/2021..  He has a history of coronary artery disease based on a moderately elevated coronary artery calcium score in 2012.  At that time he was found to have a calcium score of 286.3 with a left main score of 37.9, LAD of 34.9, circumflex 64.5 and RCA of 159.  He also has a significant family history of premature coronary artery disease, hypertension and dyslipidemia.      He underwent an exercise stress perfusion study on 01/11/2018.  He exercised for 15 minutes according to the Darren protocol.  The stress EKG and nuclear perfusion study were negative for ischemia.      He presents today feeling well overall from a cardiovascular standpoint.  He does, however, bring up occasional chest discomfort that he is experienced over the winter.  It is not clearly related to exertion, although he did have one episode while hunting that resolved spontaneously.  He denies any associated dyspnea, syncope or presyncope.    He established care with a family medicine physician last fall and lisinopril was added appropriately for better blood pressure control.  Unfortunately the patient discontinued his amlodipine at the time lisinopril was started.  His blood pressures at home are generally better than what we have obtained here, although they are still running between 130-140 mmHg.    PHYSICAL EXAMINATION:  Dictated below.      He underwent a fasting lipid panel on 12/19/2022 which demonstrate total cholesterol 113, HDL 44, LDL 36 and triglycerides 167.     IMPRESSION:   1.  Coronary artery disease  based on an abnormal calcium score in 2012.    2.  Dyslipidemia.  Lipids are at goal on rosuvastatin 20 mg daily.  3.  Hypertension.  Not optimally controlled on lisinopril 20 mg daily.    Mr. Urena is doing well overall from a cardiovascular standpoint, but I do think an ischemic reevaluation is indicated given his recent onset chest discomfort.  To that effect I have ordered an exercise stress perfusion study.  An echocardiogram will also be ordered for baseline structural/valvular assessment.    I have asked him to restart his amlodipine 5 mg twice daily in addition to his lisinopril.  Finally, I have asked him to restart aspirin 81 mg on a daily basis given his moderate coronary artery calcification.  He was previously on aspirin 325 mg daily but discontinued it at some point over the past 2 years.    It was a pleasure seeing him in follow-up today.                        CURRENT MEDICATIONS:  Current Outpatient Medications   Medication Sig Dispense Refill    acetaminophen (TYLENOL) 500 MG tablet Take 2 tablets (1,000 mg) by mouth every 6 hours as needed for pain      amLODIPine (NORVASC) 5 MG tablet Take 1 tablet (5 mg) by mouth 2 times daily 180 tablet 1    Ascorbic Acid (VITAMIN C PO) Take 1 tablet by mouth daily      ibuprofen (ADVIL/MOTRIN) 200 MG tablet Take 3 tablets (600 mg) by mouth every 6 hours as needed for moderate pain      oxyCODONE (ROXICODONE) 5 MG tablet Take 1-2 tablets (5-10 mg) by mouth every 4 hours as needed for moderate to severe pain 12 tablet 0    rosuvastatin (CRESTOR) 20 MG tablet Take 1 tablet (20 mg) by mouth At Bedtime 90 tablet 1       ALLERGIES     Allergies   Allergen Reactions    Atorvastatin Muscle Pain (Myalgia)    Penicillins      Unknown reaction    Pollen Extract     Seasonal Allergies        PAST MEDICAL HISTORY:  Past Medical History:   Diagnosis Date    Benign essential hypertension     Elevated coronary artery calcium score     total Agatston 2012-286 / 82nd  percentile    Hyperlipidemia LDL goal <130        PAST SURGICAL HISTORY:  Past Surgical History:   Procedure Laterality Date    EXTRACTION(S) DENTAL  2013    [facility] REPAIR OF NASAL SEPTUM  1978    LAPAROSCOPIC CHOLECYSTECTOMY N/A 6/10/2022    Procedure: Laparoscopic cholecystectomy;  Surgeon: Amisha Fuentes MD;  Location: [facility] OR    TONSILLECTOMY  1960's    wisdom teeth removal  age 14       FAMILY HISTORY:  Family History   Problem Relation Age of Onset    Heart Disease Father     Diabetes No family hx of        SOCIAL HISTORY:  Social History     Socioeconomic History    Marital status:    Occupational History     Employer: [employer]     Comment: service, [employer]   Tobacco Use    Smoking status: Never    Smokeless tobacco: Never   Vaping Use    Vaping status: Never Used   Substance and Sexual Activity    Alcohol use: Yes     Comment: once a month    Drug use: No    Sexual activity: Yes     Partners: Female     Comment: wife menopause   Other Topics Concern    Parent/sibling w/ CABG, MI or angioplasty before 65F 55M? Yes    Bike Helmet Yes    Seat Belt Yes       Review of Systems:  Skin:          Eyes:         ENT:         Respiratory:          Cardiovascular:         Gastroenterology:        Genitourinary:         Musculoskeletal:         Neurologic:         Psychiatric:         Heme/Lymph/Imm:         Endocrine:           Physical Exam:  Vitals: There were no vitals taken for this visit.    Constitutional:  cooperative        Skin:  warm and dry to the touch          Head:  normocephalic        Eyes:  pupils equal and round        Lymph:      ENT:           Neck:           Respiratory:  clear to auscultation         Cardiac: regular rhythm                                                         GI:  abdomen soft        Extremities and Muscular Skeletal:                 Neurological:  no gross motor deficits        Psych:  Alert and Oriented x 3        CC  Referred Self,     Thank you for allowing me to  participate in the care of your patient.      Sincerely,     Oscar Villatoro MD      Heart Care

## 2023-05-11 ENCOUNTER — HOSPITAL ENCOUNTER (OUTPATIENT)
Dept: CARDIOLOGY | Facility: CLINIC | Age: 68
Discharge: HOME | End: 2023-05-11
Attending: INTERNAL MEDICINE
Payer: COMMERCIAL

## 2023-05-11 ENCOUNTER — TELEPHONE (OUTPATIENT)
Dept: CARDIOLOGY | Facility: CLINIC | Age: 68
End: 2023-05-11

## 2023-05-11 VITALS
WEIGHT: 175.2 LBS | DIASTOLIC BLOOD PRESSURE: 80 MMHG | SYSTOLIC BLOOD PRESSURE: 160 MMHG | HEIGHT: 68 IN | HEART RATE: 52 BPM | BODY MASS INDEX: 26.55 KG/M2 | OXYGEN SATURATION: 95 %

## 2023-05-11 DIAGNOSIS — R93.1 ELEVATED CORONARY ARTERY CALCIUM SCORE: Primary | ICD-10-CM

## 2023-05-11 DIAGNOSIS — I25.10 CORONARY ARTERY DISEASE INVOLVING NATIVE CORONARY ARTERY OF NATIVE HEART WITHOUT ANGINA PECTORIS: ICD-10-CM

## 2023-05-11 LAB
CV STRESS MAX HR HE: 142
NUC STRESS EJECTION FRACTION: 59 %
RATE PRESSURE PRODUCT: NORMAL
STRESS ECHO BASELINE DIASTOLIC HE: 72
STRESS ECHO BASELINE HR: 55 BPM
STRESS ECHO BASELINE SYSTOLIC BP: 136
STRESS ECHO CALCULATED PERCENT HR: 94 %
STRESS ECHO LAST STRESS DIASTOLIC BP: 70
STRESS ECHO LAST STRESS SYSTOLIC BP: 200
STRESS ECHO POST ESTIMATED WORKLOAD: 17.2 METS
STRESS ECHO POST EXERCISE DUR MIN: 14 MIN
STRESS ECHO POST EXERCISE DUR SEC: 0 SEC
STRESS ECHO TARGET HR: 151

## 2023-05-11 PROCEDURE — 93017 CV STRESS TEST TRACING ONLY: CPT

## 2023-05-11 PROCEDURE — 93016 CV STRESS TEST SUPVJ ONLY: CPT | Performed by: INTERNAL MEDICINE

## 2023-05-11 PROCEDURE — 78452 HT MUSCLE IMAGE SPECT MULT: CPT | Mod: 26 | Performed by: INTERNAL MEDICINE

## 2023-05-11 PROCEDURE — 93018 CV STRESS TEST I&R ONLY: CPT | Performed by: INTERNAL MEDICINE

## 2023-05-11 PROCEDURE — 343N000001 HC RX 343: Performed by: INTERNAL MEDICINE

## 2023-05-11 PROCEDURE — A9502 TC99M TETROFOSMIN: HCPCS | Performed by: INTERNAL MEDICINE

## 2023-05-11 RX ADMIN — TETROFOSMIN 3.44 MILLICURIE: 1.38 INJECTION, POWDER, LYOPHILIZED, FOR SOLUTION INTRAVENOUS at 09:01

## 2023-05-11 RX ADMIN — TETROFOSMIN 9.34 MILLICURIE: 1.38 INJECTION, POWDER, LYOPHILIZED, FOR SOLUTION INTRAVENOUS at 10:35

## 2023-05-11 NOTE — LETTER
May 22, 2023       TO: Yannick Urena  [address] [facility] [address]       [facility], Mr. Urena,    Your echo report was stable.   Left ventricular systolic function is normal. The visual ejection fraction is 60-65%. No regional wall motion abnormalities noted. The right ventricle is normal in structure, function and size. No hemodynamically significant valvular abnormalities on 2D or color flow  Imaging.    Dr. Villatoro wanted to know if you were still having any episodes of chest discomfort or trouble breathing?    He would like to see you in about 3 months. You can call scheduling at [phone] to find an appointment.    Thank you  Team 2 RNs  [phone]  [facility]

## 2023-05-11 NOTE — TELEPHONE ENCOUNTER
Exercise nuc stress test completed as below, ordered by Dr Villatoro at visit 5/1/23 due to patient reports of recent chest discomfort. Hx CAD. Echo is scheduled for 5/17. Routed to provider to review.          The nuclear stress test is negative for inducible myocardial ischemia or infarction. Inferior wall defect is noted that improved on prone positioning indicating this is diaphragmatic attenuation artifact.     Patoent exercised for 14 minutes achieving 17.2 METs demonstrating excellent functional aerobic capacity.     Left ventricular function is normal.     The left ventricular ejection fraction at stress is 59%.

## 2023-05-12 NOTE — TELEPHONE ENCOUNTER
Results reviewed and doned by Dr Villatoro. Spoke to patient and reviewed negative stress test, will await results of echo next week.

## 2023-05-17 ENCOUNTER — HOSPITAL ENCOUNTER (OUTPATIENT)
Dept: CARDIOLOGY | Facility: CLINIC | Age: 68
Discharge: HOME | End: 2023-05-17
Attending: INTERNAL MEDICINE | Admitting: INTERNAL MEDICINE
Payer: COMMERCIAL

## 2023-05-17 DIAGNOSIS — R06.00 DYSPNEA, UNSPECIFIED TYPE: ICD-10-CM

## 2023-05-17 LAB — LVEF ECHO: NORMAL

## 2023-05-17 PROCEDURE — 93306 TTE W/DOPPLER COMPLETE: CPT | Mod: 26 | Performed by: INTERNAL MEDICINE

## 2023-05-17 PROCEDURE — 93306 TTE W/DOPPLER COMPLETE: CPT

## 2023-05-17 NOTE — TELEPHONE ENCOUNTER
Echo available to review, routed to Dr Villatoro.     Left ventricular systolic function is normal.  The visual ejection fraction is 60-65%.  No regional wall motion abnormalities noted.  The right ventricle is normal in structure, function and size.  No hemodynamically significant valvular abnormalities on 2D or color flow  imaging. There is no comparison study available.

## 2023-05-22 NOTE — TELEPHONE ENCOUNTER
Unable to reach patient by phone and he does not use my chart. Letter sent:    Elizabeth, Mr. Urena,    Your echo report was stable.   Left ventricular systolic function is normal. The visual ejection fraction is 60-65%. No regional wall motion abnormalities noted. The right ventricle is normal in structure, function and size. No hemodynamically significant valvular abnormalities on 2D or color flow  Imaging.    Dr. Villatoro wanted to know if you were still having any episodes of chest discomfort or trouble breathing?    He would like to see you in about 3 months. You can call scheduling at [phone] to find an appointment.    Thank you  Team 2 RNs  [phone]

## 2023-05-23 ENCOUNTER — TELEPHONE (OUTPATIENT)
Dept: CARDIOLOGY | Facility: CLINIC | Age: 68
End: 2023-05-23
Payer: COMMERCIAL

## 2023-05-23 NOTE — TELEPHONE ENCOUNTER
[place] Community Measures Blood Pressure guideline reviewed.  Patients recent blood pressure is outside of guideline parameters.  Called pt to review, no answer.  Left voicemail message asking patient to check their blood pressure using a home blood pressure cuff or by going to a [facility].  Patient instructed to then call [phone] (Mayra) and leave a message with their name, date of birth, and blood pressure reading that was completed within the last 24 hours and where it was completed.  Will await call back for further review.  TRIP Noriega

## 2024-04-03 ENCOUNTER — OFFICE VISIT (OUTPATIENT)
Dept: URGENT CARE | Facility: URGENT CARE | Age: 69
End: 2024-04-03
Payer: COMMERCIAL

## 2024-04-03 VITALS
OXYGEN SATURATION: 98 % | HEART RATE: 53 BPM | TEMPERATURE: 97 F | DIASTOLIC BLOOD PRESSURE: 78 MMHG | WEIGHT: 177.6 LBS | SYSTOLIC BLOOD PRESSURE: 174 MMHG | RESPIRATION RATE: 16 BRPM | BODY MASS INDEX: 27 KG/M2

## 2024-04-03 DIAGNOSIS — Z23 NEED FOR TDAP VACCINATION: ICD-10-CM

## 2024-04-03 DIAGNOSIS — L03.211 CELLULITIS OF FACE: Primary | ICD-10-CM

## 2024-04-03 PROCEDURE — 90471 IMMUNIZATION ADMIN: CPT | Mod: 59 | Performed by: PHYSICIAN ASSISTANT

## 2024-04-03 PROCEDURE — 96372 THER/PROPH/DIAG INJ SC/IM: CPT | Mod: 59 | Performed by: PHYSICIAN ASSISTANT

## 2024-04-03 PROCEDURE — 99214 OFFICE O/P EST MOD 30 MIN: CPT | Mod: 25 | Performed by: PHYSICIAN ASSISTANT

## 2024-04-03 PROCEDURE — 90715 TDAP VACCINE 7 YRS/> IM: CPT | Performed by: PHYSICIAN ASSISTANT

## 2024-04-03 RX ORDER — CEFTRIAXONE SODIUM 1 G
1 VIAL (EA) INJECTION ONCE
Status: COMPLETED | OUTPATIENT
Start: 2024-04-03 | End: 2024-04-03

## 2024-04-03 RX ORDER — CEPHALEXIN 500 MG/1
500 CAPSULE ORAL 3 TIMES DAILY
Qty: 21 CAPSULE | Refills: 0 | Status: SHIPPED | OUTPATIENT
Start: 2024-04-03 | End: 2024-04-10

## 2024-04-03 RX ORDER — LISINOPRIL 20 MG/1
1 TABLET ORAL DAILY
COMMUNITY
Start: 2022-09-23

## 2024-04-03 RX ORDER — SULFAMETHOXAZOLE/TRIMETHOPRIM 800-160 MG
1 TABLET ORAL 2 TIMES DAILY
Qty: 14 TABLET | Refills: 0 | Status: SHIPPED | OUTPATIENT
Start: 2024-04-03 | End: 2024-04-10

## 2024-04-03 RX ADMIN — Medication 1 G: at 10:57

## 2024-04-03 NOTE — PROGRESS NOTES
SUBJECTIVE:  Yannick Urena is a 68 year old male who presents to the clinic today for a rash.  Onset of rash was 2 day(s) ago.   Rash is gradual onset.  Location of the rash: face.  Quality/symptoms of rash: painful and red   Symptoms are mild and rash seems to be worsening.  Previous history of a similar rash? No  Recent exposure history: popped a small zit at nose     Associated symptoms include: nothing.    Past Medical History:   Diagnosis Date    Benign essential hypertension     Elevated coronary artery calcium score     total Agatston 2012-286 / 82nd percentile    Hyperlipidemia LDL goal <130      Current Outpatient Medications   Medication Sig Dispense Refill    acetaminophen (TYLENOL) 500 MG tablet Take 2 tablets (1,000 mg) by mouth every 6 hours as needed for pain      amLODIPine (NORVASC) 5 MG tablet Take 1 tablet (5 mg) by mouth 2 times daily 180 tablet 3    Ascorbic Acid (VITAMIN C PO) Take 1 tablet by mouth daily      calcium carbonate (TUMS) 500 MG chewable tablet Take 1 chew tab by mouth 2 times daily      cephALEXin (KEFLEX) 500 MG capsule Take 1 capsule (500 mg) by mouth 3 times daily for 7 days 21 capsule 0    cephALEXin (KEFLEX) 500 MG capsule Take 1 capsule (500 mg) by mouth 3 times daily for 7 days 21 capsule 0    ibuprofen (ADVIL/MOTRIN) 200 MG tablet Take 3 tablets (600 mg) by mouth every 6 hours as needed for moderate pain      lisinopril (ZESTRIL) 20 MG tablet Take 1 tablet by mouth daily      rosuvastatin (CRESTOR) 20 MG tablet Take 1 tablet (20 mg) by mouth At Bedtime 90 tablet 3    sulfamethoxazole-trimethoprim (BACTRIM DS) 800-160 MG tablet Take 1 tablet by mouth 2 times daily for 7 days 14 tablet 0    oxyCODONE (ROXICODONE) 5 MG tablet Take 1-2 tablets (5-10 mg) by mouth every 4 hours as needed for moderate to severe pain (Patient not taking: Reported on 5/1/2023) 12 tablet 0     Social History     Tobacco Use    Smoking status: Never    Smokeless tobacco: Never   Substance Use  Topics    Alcohol use: Yes     Comment: a few beers per month       ROS:  Review of systems negative except as stated above.    EXAM:   BP (!) 174/78   Pulse 53   Temp 97  F (36.1  C) (Tympanic)   Resp 16   Wt 80.6 kg (177 lb 9.6 oz)   SpO2 98%   BMI 27.00 kg/m    GENERAL: alert, no acute distress.  SKIN: mild erythema extending from nare  GENERAL APPEARANCE: healthy, alert and no distress  RESP: lungs clear to auscultation - no rales, rhonchi or wheezes  CV: regular rates and rhythm, normal S1 S2, no murmur noted  NEURO: Normal strength and tone, sensory exam grossly normal,  normal speech and mentation      No results found for any visits on 04/03/24.    ASSESSMENT:  (L03.211) Cellulitis of face  (primary encounter diagnosis)  Plan: cefTRIAXone (ROCEPHIN) in lidocaine 1% (PF) for        IM administration 1 g, cephALEXin (KEFLEX) 500         MG capsule, sulfamethoxazole-trimethoprim         (BACTRIM DS) 800-160 MG tablet, cephALEXin         (KEFLEX) 500 MG capsule, DISCONTINUED: Tdap         (tetanus-diphtheria-acell pertussis) (ADACEL)         injection 0.5 mL      (Z23) Need for Tdap vaccination  Plan: DISCONTINUED: Tdap, tetanus-diptheria-acell         pertussis, (BOOSTRIX) 5-2.5-18.5 LF-MCG/0.5         MATT injection

## 2024-07-24 DIAGNOSIS — I25.10 CORONARY ARTERY DISEASE INVOLVING NATIVE CORONARY ARTERY OF NATIVE HEART WITHOUT ANGINA PECTORIS: ICD-10-CM

## 2024-07-24 DIAGNOSIS — I10 BENIGN ESSENTIAL HYPERTENSION: ICD-10-CM

## 2024-07-24 RX ORDER — AMLODIPINE BESYLATE 5 MG/1
5 TABLET ORAL 2 TIMES DAILY
Qty: 180 TABLET | Refills: 0 | Status: SHIPPED | OUTPATIENT
Start: 2024-07-24

## (undated) DEVICE — SYR 30ML LL W/O NDL 302832

## (undated) DEVICE — GLOVE PROTEXIS BLUE W/NEU-THERA 6.5  2D73EB65

## (undated) DEVICE — ENDO TROCAR FIRST ENTRY KII FIOS Z-THRD 05X100MM CTF03

## (undated) DEVICE — SU VICRYL 4-0 PS-2 18" UND J496H

## (undated) DEVICE — ENDO TROCAR SLEEVE KII Z-THREADED 05X100MM CTS02

## (undated) DEVICE — ESU GROUND PAD ADULT W/CORD E7507

## (undated) DEVICE — GLOVE PROTEXIS W/NEU-THERA 6.5  2D73TE65

## (undated) DEVICE — ESU ELEC BLADE 2.75" COATED/INSULATED E1455

## (undated) DEVICE — SURGICEL HEMOSTAT 2X14" 1951

## (undated) DEVICE — SOL NACL 0.9% IRRIG 3000ML BAG 2B7477

## (undated) DEVICE — ESU CORD MONOPOLAR 10'  E0510

## (undated) DEVICE — Device

## (undated) DEVICE — BAG CLEAR TRASH 1.3M 39X33" P4040C

## (undated) DEVICE — LINEN FULL SHEET 5511

## (undated) DEVICE — SU VICRYL 0 UR-6 27" J603H

## (undated) DEVICE — ESU PENCIL W/HOLSTER E2350H

## (undated) DEVICE — SOL NACL 0.9% IRRIG 1000ML BOTTLE 07138-09

## (undated) DEVICE — LINEN POUCH DBL 5427

## (undated) DEVICE — LINEN TOWEL PACK X10 5473

## (undated) DEVICE — SUCTION CANISTER MEDIVAC LINER 3000ML W/LID 65651-530

## (undated) DEVICE — LINEN HALF SHEET 5512

## (undated) DEVICE — ENDO TROCAR BLUNT TIP KII BALLOON 12X100MM C0R47

## (undated) RX ORDER — GLYCOPYRROLATE 0.2 MG/ML
INJECTION INTRAMUSCULAR; INTRAVENOUS
Status: DISPENSED
Start: 2022-06-10

## (undated) RX ORDER — PROPOFOL 10 MG/ML
INJECTION, EMULSION INTRAVENOUS
Status: DISPENSED
Start: 2022-06-10

## (undated) RX ORDER — BUPIVACAINE HYDROCHLORIDE 5 MG/ML
INJECTION, SOLUTION EPIDURAL; INTRACAUDAL
Status: DISPENSED
Start: 2022-06-10

## (undated) RX ORDER — DEXAMETHASONE SODIUM PHOSPHATE 4 MG/ML
INJECTION, SOLUTION INTRA-ARTICULAR; INTRALESIONAL; INTRAMUSCULAR; INTRAVENOUS; SOFT TISSUE
Status: DISPENSED
Start: 2022-06-10

## (undated) RX ORDER — CLINDAMYCIN PHOSPHATE 900 MG/50ML
INJECTION, SOLUTION INTRAVENOUS
Status: DISPENSED
Start: 2022-06-10

## (undated) RX ORDER — LIDOCAINE HYDROCHLORIDE 10 MG/ML
INJECTION, SOLUTION EPIDURAL; INFILTRATION; INTRACAUDAL; PERINEURAL
Status: DISPENSED
Start: 2022-06-10

## (undated) RX ORDER — ONDANSETRON 2 MG/ML
INJECTION INTRAMUSCULAR; INTRAVENOUS
Status: DISPENSED
Start: 2022-06-10

## (undated) RX ORDER — NEOSTIGMINE METHYLSULFATE 1 MG/ML
VIAL (ML) INJECTION
Status: DISPENSED
Start: 2022-06-10

## (undated) RX ORDER — FENTANYL CITRATE 50 UG/ML
INJECTION, SOLUTION INTRAMUSCULAR; INTRAVENOUS
Status: DISPENSED
Start: 2022-06-10

## (undated) RX ORDER — INDOCYANINE GREEN AND WATER 25 MG
KIT INJECTION
Status: DISPENSED
Start: 2022-06-10